# Patient Record
Sex: FEMALE | Race: BLACK OR AFRICAN AMERICAN | Employment: UNEMPLOYED | URBAN - METROPOLITAN AREA
[De-identification: names, ages, dates, MRNs, and addresses within clinical notes are randomized per-mention and may not be internally consistent; named-entity substitution may affect disease eponyms.]

---

## 2018-05-15 ENCOUNTER — HOSPITAL ENCOUNTER (EMERGENCY)
Age: 23
Discharge: HOME | End: 2018-05-15
Attending: EMERGENCY MEDICINE
Payer: COMMERCIAL

## 2018-05-15 VITALS
HEART RATE: 77 BPM | WEIGHT: 98 LBS | BODY MASS INDEX: 19.14 KG/M2 | OXYGEN SATURATION: 98 % | DIASTOLIC BLOOD PRESSURE: 73 MMHG | TEMPERATURE: 98.6 F | SYSTOLIC BLOOD PRESSURE: 111 MMHG | RESPIRATION RATE: 16 BRPM

## 2018-05-15 DIAGNOSIS — T78.40XA ALLERGIC REACTION, INITIAL ENCOUNTER: Primary | ICD-10-CM

## 2018-05-15 PROCEDURE — 99282 EMERGENCY DEPT VISIT SF MDM: CPT

## 2018-05-15 RX ORDER — METHYLPREDNISOLONE 4 MG/1
TABLET ORAL
Qty: 1 KIT | Refills: 0 | Status: SHIPPED | OUTPATIENT
Start: 2018-05-15 | End: 2018-05-21

## 2019-01-04 ENCOUNTER — APPOINTMENT (OUTPATIENT)
Dept: LABOR AND DELIVERY | Age: 24
End: 2019-01-04
Payer: COMMERCIAL

## 2019-01-04 ENCOUNTER — HOSPITAL ENCOUNTER (OUTPATIENT)
Age: 24
Discharge: HOME | End: 2019-01-04
Attending: OBSTETRICS & GYNECOLOGY | Admitting: OBSTETRICS & GYNECOLOGY
Payer: COMMERCIAL

## 2019-01-04 VITALS
SYSTOLIC BLOOD PRESSURE: 99 MMHG | HEART RATE: 84 BPM | DIASTOLIC BLOOD PRESSURE: 54 MMHG | TEMPERATURE: 98.2 F | RESPIRATION RATE: 16 BRPM

## 2019-01-04 PROBLEM — O36.5930: Status: ACTIVE | Noted: 2019-01-04

## 2019-01-04 PROCEDURE — 59025 FETAL NON-STRESS TEST: CPT

## 2019-01-07 ENCOUNTER — HOSPITAL ENCOUNTER (OUTPATIENT)
Age: 24
Discharge: HOME | End: 2019-01-07
Attending: OBSTETRICS & GYNECOLOGY | Admitting: OBSTETRICS & GYNECOLOGY
Payer: COMMERCIAL

## 2019-01-07 ENCOUNTER — APPOINTMENT (OUTPATIENT)
Dept: LABOR AND DELIVERY | Age: 24
End: 2019-01-07
Payer: COMMERCIAL

## 2019-01-07 VITALS
SYSTOLIC BLOOD PRESSURE: 104 MMHG | TEMPERATURE: 98.3 F | HEART RATE: 75 BPM | RESPIRATION RATE: 16 BRPM | DIASTOLIC BLOOD PRESSURE: 59 MMHG

## 2019-01-07 PROCEDURE — 59025 FETAL NON-STRESS TEST: CPT

## 2019-01-11 ENCOUNTER — APPOINTMENT (OUTPATIENT)
Dept: LABOR AND DELIVERY | Age: 24
End: 2019-01-11
Payer: COMMERCIAL

## 2019-01-11 ENCOUNTER — HOSPITAL ENCOUNTER (OUTPATIENT)
Age: 24
Discharge: HOME | End: 2019-01-11
Attending: OBSTETRICS & GYNECOLOGY | Admitting: OBSTETRICS & GYNECOLOGY
Payer: COMMERCIAL

## 2019-01-11 VITALS
SYSTOLIC BLOOD PRESSURE: 105 MMHG | RESPIRATION RATE: 16 BRPM | DIASTOLIC BLOOD PRESSURE: 69 MMHG | HEART RATE: 81 BPM | TEMPERATURE: 98 F

## 2019-01-11 PROCEDURE — 59025 FETAL NON-STRESS TEST: CPT

## 2019-01-14 ENCOUNTER — HOSPITAL ENCOUNTER (OUTPATIENT)
Age: 24
Discharge: HOME | End: 2019-01-14
Attending: OBSTETRICS & GYNECOLOGY | Admitting: OBSTETRICS & GYNECOLOGY
Payer: COMMERCIAL

## 2019-01-14 ENCOUNTER — APPOINTMENT (OUTPATIENT)
Dept: LABOR AND DELIVERY | Age: 24
End: 2019-01-14
Payer: COMMERCIAL

## 2019-01-14 VITALS
SYSTOLIC BLOOD PRESSURE: 91 MMHG | TEMPERATURE: 98.1 F | DIASTOLIC BLOOD PRESSURE: 57 MMHG | RESPIRATION RATE: 16 BRPM | HEART RATE: 86 BPM

## 2019-01-14 PROCEDURE — 59025 FETAL NON-STRESS TEST: CPT

## 2019-01-18 ENCOUNTER — APPOINTMENT (OUTPATIENT)
Dept: LABOR AND DELIVERY | Age: 24
End: 2019-01-18
Payer: COMMERCIAL

## 2019-01-18 ENCOUNTER — HOSPITAL ENCOUNTER (OUTPATIENT)
Age: 24
Discharge: HOME | End: 2019-01-18
Attending: OBSTETRICS & GYNECOLOGY | Admitting: OBSTETRICS & GYNECOLOGY
Payer: COMMERCIAL

## 2019-01-18 ENCOUNTER — APPOINTMENT (OUTPATIENT)
Dept: ULTRASOUND IMAGING | Age: 24
End: 2019-01-18
Payer: COMMERCIAL

## 2019-01-18 VITALS
RESPIRATION RATE: 16 BRPM | HEART RATE: 74 BPM | SYSTOLIC BLOOD PRESSURE: 93 MMHG | TEMPERATURE: 98.1 F | DIASTOLIC BLOOD PRESSURE: 50 MMHG

## 2019-01-18 PROCEDURE — 59025 FETAL NON-STRESS TEST: CPT

## 2019-01-18 PROCEDURE — 99211 OFF/OP EST MAY X REQ PHY/QHP: CPT

## 2019-01-18 PROCEDURE — 76819 FETAL BIOPHYS PROFIL W/O NST: CPT

## 2019-01-22 ENCOUNTER — HOSPITAL ENCOUNTER (OUTPATIENT)
Age: 24
Discharge: HOME | End: 2019-01-22
Attending: OBSTETRICS & GYNECOLOGY | Admitting: OBSTETRICS & GYNECOLOGY
Payer: COMMERCIAL

## 2019-01-22 ENCOUNTER — APPOINTMENT (OUTPATIENT)
Dept: LABOR AND DELIVERY | Age: 24
End: 2019-01-22
Payer: COMMERCIAL

## 2019-01-22 VITALS
DIASTOLIC BLOOD PRESSURE: 64 MMHG | TEMPERATURE: 98.7 F | SYSTOLIC BLOOD PRESSURE: 102 MMHG | RESPIRATION RATE: 14 BRPM | HEART RATE: 81 BPM

## 2019-01-22 PROCEDURE — 59025 FETAL NON-STRESS TEST: CPT

## 2019-01-25 ENCOUNTER — APPOINTMENT (OUTPATIENT)
Dept: LABOR AND DELIVERY | Age: 24
End: 2019-01-25
Payer: COMMERCIAL

## 2019-01-25 ENCOUNTER — HOSPITAL ENCOUNTER (OUTPATIENT)
Age: 24
Discharge: HOME | End: 2019-01-25
Attending: OBSTETRICS & GYNECOLOGY | Admitting: OBSTETRICS & GYNECOLOGY
Payer: COMMERCIAL

## 2019-01-25 VITALS
HEART RATE: 80 BPM | RESPIRATION RATE: 14 BRPM | SYSTOLIC BLOOD PRESSURE: 108 MMHG | TEMPERATURE: 98.1 F | DIASTOLIC BLOOD PRESSURE: 62 MMHG

## 2019-01-25 PROCEDURE — 59025 FETAL NON-STRESS TEST: CPT

## 2019-01-29 ENCOUNTER — HOSPITAL ENCOUNTER (OUTPATIENT)
Age: 24
Discharge: HOME | End: 2019-01-29
Attending: OBSTETRICS & GYNECOLOGY | Admitting: OBSTETRICS & GYNECOLOGY
Payer: COMMERCIAL

## 2019-01-29 ENCOUNTER — APPOINTMENT (OUTPATIENT)
Dept: LABOR AND DELIVERY | Age: 24
End: 2019-01-29
Payer: COMMERCIAL

## 2019-01-29 VITALS
RESPIRATION RATE: 16 BRPM | HEART RATE: 74 BPM | SYSTOLIC BLOOD PRESSURE: 106 MMHG | TEMPERATURE: 98.3 F | DIASTOLIC BLOOD PRESSURE: 60 MMHG

## 2019-01-29 PROCEDURE — 59025 FETAL NON-STRESS TEST: CPT

## 2019-01-31 ENCOUNTER — HOSPITAL ENCOUNTER (OUTPATIENT)
Age: 24
Setting detail: OBSERVATION
Discharge: HOME | End: 2019-02-01
Attending: OBSTETRICS & GYNECOLOGY | Admitting: OBSTETRICS & GYNECOLOGY
Payer: COMMERCIAL

## 2019-01-31 ENCOUNTER — HOSPITAL ENCOUNTER (OUTPATIENT)
Age: 24
Discharge: HOME | End: 2019-02-02
Payer: COMMERCIAL

## 2019-01-31 PROBLEM — O26.93 COMPLICATED PREGNANCY, THIRD TRIMESTER: Status: ACTIVE | Noted: 2019-01-31

## 2019-01-31 LAB
AMPHETAMINE SCREEN, URINE: NOT DETECTED
BACTERIA: ABNORMAL /HPF
BARBITURATE SCREEN URINE: NOT DETECTED
BENZODIAZEPINE SCREEN, URINE: NOT DETECTED
BILIRUBIN URINE: NEGATIVE
BLOOD, URINE: NEGATIVE
CANNABINOID SCREEN URINE: NOT DETECTED
CLARITY: CLEAR
COCAINE METABOLITE SCREEN URINE: NOT DETECTED
COLOR: YELLOW
EPITHELIAL CELLS, UA: ABNORMAL /HPF
GLUCOSE URINE: NEGATIVE MG/DL
KETONES, URINE: NEGATIVE MG/DL
LEUKOCYTE ESTERASE, URINE: ABNORMAL
METHADONE SCREEN, URINE: NOT DETECTED
NITRITE, URINE: NEGATIVE
OPIATE SCREEN URINE: NOT DETECTED
PH UA: 7.5 (ref 5–9)
PHENCYCLIDINE SCREEN URINE: NOT DETECTED
PROPOXYPHENE SCREEN: NOT DETECTED
PROTEIN UA: NEGATIVE MG/DL
RBC UA: ABNORMAL /HPF (ref 0–2)
SPECIFIC GRAVITY UA: 1.02 (ref 1–1.03)
UROBILINOGEN, URINE: 0.2 E.U./DL
WBC UA: ABNORMAL /HPF (ref 0–5)

## 2019-01-31 PROCEDURE — 2580000003 HC RX 258: Performed by: OBSTETRICS & GYNECOLOGY

## 2019-01-31 PROCEDURE — 81001 URINALYSIS AUTO W/SCOPE: CPT

## 2019-01-31 PROCEDURE — 96360 HYDRATION IV INFUSION INIT: CPT

## 2019-01-31 PROCEDURE — 87081 CULTURE SCREEN ONLY: CPT

## 2019-01-31 PROCEDURE — 80307 DRUG TEST PRSMV CHEM ANLYZR: CPT

## 2019-01-31 RX ORDER — SODIUM CHLORIDE, SODIUM LACTATE, POTASSIUM CHLORIDE, CALCIUM CHLORIDE 600; 310; 30; 20 MG/100ML; MG/100ML; MG/100ML; MG/100ML
INJECTION, SOLUTION INTRAVENOUS CONTINUOUS
Status: DISCONTINUED | OUTPATIENT
Start: 2019-02-01 | End: 2019-02-01 | Stop reason: HOSPADM

## 2019-01-31 RX ORDER — SODIUM CHLORIDE, SODIUM LACTATE, POTASSIUM CHLORIDE, AND CALCIUM CHLORIDE .6; .31; .03; .02 G/100ML; G/100ML; G/100ML; G/100ML
500 INJECTION, SOLUTION INTRAVENOUS ONCE
Status: COMPLETED | OUTPATIENT
Start: 2019-01-31 | End: 2019-02-01

## 2019-01-31 RX ADMIN — SODIUM CHLORIDE, POTASSIUM CHLORIDE, SODIUM LACTATE AND CALCIUM CHLORIDE 500 ML: 600; 310; 30; 20 INJECTION, SOLUTION INTRAVENOUS at 23:05

## 2019-01-31 ASSESSMENT — PAIN SCALES - GENERAL: PAINLEVEL_OUTOF10: 0

## 2019-02-01 VITALS
HEART RATE: 79 BPM | DIASTOLIC BLOOD PRESSURE: 51 MMHG | TEMPERATURE: 98 F | SYSTOLIC BLOOD PRESSURE: 90 MMHG | RESPIRATION RATE: 16 BRPM

## 2019-02-01 PROBLEM — O47.03 FALSE LABOR BEFORE 37 COMPLETED WEEKS OF GESTATION IN THIRD TRIMESTER: Status: ACTIVE | Noted: 2019-02-01

## 2019-02-01 PROCEDURE — 96360 HYDRATION IV INFUSION INIT: CPT

## 2019-02-01 PROCEDURE — G0378 HOSPITAL OBSERVATION PER HR: HCPCS

## 2019-02-01 PROCEDURE — 99211 OFF/OP EST MAY X REQ PHY/QHP: CPT

## 2019-02-01 PROCEDURE — 96361 HYDRATE IV INFUSION ADD-ON: CPT

## 2019-02-01 ASSESSMENT — PAIN SCALES - GENERAL: PAINLEVEL_OUTOF10: 0

## 2019-02-03 LAB — GROUP B STREP CULTURE: NORMAL

## 2019-02-05 ENCOUNTER — APPOINTMENT (OUTPATIENT)
Dept: LABOR AND DELIVERY | Age: 24
End: 2019-02-05
Payer: COMMERCIAL

## 2019-02-05 ENCOUNTER — HOSPITAL ENCOUNTER (OUTPATIENT)
Age: 24
Discharge: HOME | End: 2019-02-05
Attending: OBSTETRICS & GYNECOLOGY | Admitting: OBSTETRICS & GYNECOLOGY
Payer: COMMERCIAL

## 2019-02-05 VITALS
RESPIRATION RATE: 18 BRPM | TEMPERATURE: 98 F | HEART RATE: 81 BPM | DIASTOLIC BLOOD PRESSURE: 69 MMHG | SYSTOLIC BLOOD PRESSURE: 102 MMHG

## 2019-02-05 PROCEDURE — G0379 DIRECT REFER HOSPITAL OBSERV: HCPCS

## 2019-02-05 PROCEDURE — G0378 HOSPITAL OBSERVATION PER HR: HCPCS

## 2019-02-05 PROCEDURE — 59025 FETAL NON-STRESS TEST: CPT

## 2019-02-08 ENCOUNTER — APPOINTMENT (OUTPATIENT)
Dept: LABOR AND DELIVERY | Age: 24
End: 2019-02-08
Payer: COMMERCIAL

## 2019-02-08 ENCOUNTER — HOSPITAL ENCOUNTER (OUTPATIENT)
Age: 24
Discharge: HOME | End: 2019-02-08
Attending: OBSTETRICS & GYNECOLOGY | Admitting: OBSTETRICS & GYNECOLOGY
Payer: COMMERCIAL

## 2019-02-08 VITALS
TEMPERATURE: 98.1 F | RESPIRATION RATE: 16 BRPM | DIASTOLIC BLOOD PRESSURE: 65 MMHG | SYSTOLIC BLOOD PRESSURE: 101 MMHG | HEART RATE: 63 BPM

## 2019-02-08 PROCEDURE — 59025 FETAL NON-STRESS TEST: CPT

## 2019-02-11 ENCOUNTER — HOSPITAL ENCOUNTER (OUTPATIENT)
Age: 24
Discharge: HOME | End: 2019-02-11
Attending: OBSTETRICS & GYNECOLOGY | Admitting: OBSTETRICS & GYNECOLOGY
Payer: COMMERCIAL

## 2019-02-11 ENCOUNTER — APPOINTMENT (OUTPATIENT)
Dept: LABOR AND DELIVERY | Age: 24
End: 2019-02-11
Payer: COMMERCIAL

## 2019-02-11 VITALS
DIASTOLIC BLOOD PRESSURE: 65 MMHG | HEART RATE: 75 BPM | SYSTOLIC BLOOD PRESSURE: 103 MMHG | RESPIRATION RATE: 16 BRPM | TEMPERATURE: 98.9 F

## 2019-02-11 PROCEDURE — 59025 FETAL NON-STRESS TEST: CPT

## 2019-02-15 ENCOUNTER — APPOINTMENT (OUTPATIENT)
Dept: LABOR AND DELIVERY | Age: 24
End: 2019-02-15
Payer: COMMERCIAL

## 2019-02-15 ENCOUNTER — HOSPITAL ENCOUNTER (OUTPATIENT)
Age: 24
Discharge: HOME | End: 2019-02-15
Attending: OBSTETRICS & GYNECOLOGY | Admitting: OBSTETRICS & GYNECOLOGY
Payer: COMMERCIAL

## 2019-02-15 VITALS
HEART RATE: 90 BPM | RESPIRATION RATE: 16 BRPM | TEMPERATURE: 98.4 F | DIASTOLIC BLOOD PRESSURE: 69 MMHG | SYSTOLIC BLOOD PRESSURE: 104 MMHG

## 2019-02-15 PROCEDURE — 59025 FETAL NON-STRESS TEST: CPT

## 2019-02-19 ENCOUNTER — HOSPITAL ENCOUNTER (OUTPATIENT)
Age: 24
Discharge: HOME | End: 2019-02-19
Attending: OBSTETRICS & GYNECOLOGY | Admitting: OBSTETRICS & GYNECOLOGY
Payer: COMMERCIAL

## 2019-02-19 VITALS
HEART RATE: 68 BPM | DIASTOLIC BLOOD PRESSURE: 49 MMHG | SYSTOLIC BLOOD PRESSURE: 87 MMHG | RESPIRATION RATE: 16 BRPM | TEMPERATURE: 98 F

## 2019-02-19 PROCEDURE — 59025 FETAL NON-STRESS TEST: CPT

## 2019-02-22 ENCOUNTER — HOSPITAL ENCOUNTER (OUTPATIENT)
Age: 24
Discharge: HOME | End: 2019-02-22
Attending: OBSTETRICS & GYNECOLOGY | Admitting: OBSTETRICS & GYNECOLOGY
Payer: COMMERCIAL

## 2019-02-22 ENCOUNTER — APPOINTMENT (OUTPATIENT)
Dept: LABOR AND DELIVERY | Age: 24
End: 2019-02-22
Payer: COMMERCIAL

## 2019-02-22 VITALS
HEART RATE: 79 BPM | RESPIRATION RATE: 16 BRPM | TEMPERATURE: 98.2 F | SYSTOLIC BLOOD PRESSURE: 102 MMHG | DIASTOLIC BLOOD PRESSURE: 61 MMHG

## 2019-02-22 PROCEDURE — 59025 FETAL NON-STRESS TEST: CPT

## 2019-02-26 ENCOUNTER — APPOINTMENT (OUTPATIENT)
Dept: LABOR AND DELIVERY | Age: 24
End: 2019-02-26
Payer: COMMERCIAL

## 2019-02-26 ENCOUNTER — HOSPITAL ENCOUNTER (OUTPATIENT)
Age: 24
Discharge: HOME | End: 2019-02-26
Attending: OBSTETRICS & GYNECOLOGY | Admitting: OBSTETRICS & GYNECOLOGY
Payer: COMMERCIAL

## 2019-02-26 VITALS
RESPIRATION RATE: 18 BRPM | TEMPERATURE: 99.3 F | DIASTOLIC BLOOD PRESSURE: 67 MMHG | SYSTOLIC BLOOD PRESSURE: 104 MMHG | HEART RATE: 73 BPM

## 2019-02-26 PROCEDURE — 59025 FETAL NON-STRESS TEST: CPT

## 2019-03-01 ENCOUNTER — HOSPITAL ENCOUNTER (OUTPATIENT)
Age: 24
Discharge: HOME | DRG: 560 | End: 2019-03-01
Attending: OBSTETRICS & GYNECOLOGY | Admitting: OBSTETRICS & GYNECOLOGY
Payer: COMMERCIAL

## 2019-03-01 ENCOUNTER — APPOINTMENT (OUTPATIENT)
Dept: LABOR AND DELIVERY | Age: 24
DRG: 560 | End: 2019-03-01
Payer: COMMERCIAL

## 2019-03-01 VITALS
SYSTOLIC BLOOD PRESSURE: 118 MMHG | RESPIRATION RATE: 16 BRPM | HEART RATE: 80 BPM | TEMPERATURE: 98.4 F | DIASTOLIC BLOOD PRESSURE: 63 MMHG

## 2019-03-01 PROCEDURE — 59025 FETAL NON-STRESS TEST: CPT

## 2019-03-02 NOTE — PROGRESS NOTES
Diagnosis:       IUGR      Result:             Reactive         Plan:                Discharge home                          F/U as scheduled        Rico Kathleen

## 2019-03-04 ENCOUNTER — ANESTHESIA (OUTPATIENT)
Dept: LABOR AND DELIVERY | Age: 24
DRG: 560 | End: 2019-03-04
Payer: COMMERCIAL

## 2019-03-04 ENCOUNTER — HOSPITAL ENCOUNTER (INPATIENT)
Age: 24
LOS: 2 days | Discharge: HOME | DRG: 560 | End: 2019-03-06
Attending: OBSTETRICS & GYNECOLOGY | Admitting: OBSTETRICS & GYNECOLOGY
Payer: COMMERCIAL

## 2019-03-04 ENCOUNTER — ANESTHESIA EVENT (OUTPATIENT)
Dept: LABOR AND DELIVERY | Age: 24
DRG: 560 | End: 2019-03-04
Payer: COMMERCIAL

## 2019-03-04 PROBLEM — O26.93 COMPLICATED PREGNANCY, THIRD TRIMESTER: Status: RESOLVED | Noted: 2019-01-31 | Resolved: 2019-03-04

## 2019-03-04 PROBLEM — O47.03 FALSE LABOR BEFORE 37 COMPLETED WEEKS OF GESTATION IN THIRD TRIMESTER: Status: RESOLVED | Noted: 2019-02-01 | Resolved: 2019-03-04

## 2019-03-04 PROBLEM — Z3A.39 39 WEEKS GESTATION OF PREGNANCY: Status: ACTIVE | Noted: 2019-03-04

## 2019-03-04 LAB
ABO/RH: NORMAL
AMPHETAMINE SCREEN, URINE: NOT DETECTED
ANTIBODY SCREEN: NORMAL
BARBITURATE SCREEN URINE: NOT DETECTED
BENZODIAZEPINE SCREEN, URINE: NOT DETECTED
CANNABINOID SCREEN URINE: NOT DETECTED
COCAINE METABOLITE SCREEN URINE: NOT DETECTED
HCT VFR BLD CALC: 35.4 % (ref 34–48)
HEMOGLOBIN: 11.6 G/DL (ref 11.5–15.5)
MCH RBC QN AUTO: 30.8 PG (ref 26–35)
MCHC RBC AUTO-ENTMCNC: 32.8 % (ref 32–34.5)
MCV RBC AUTO: 93.9 FL (ref 80–99.9)
METHADONE SCREEN, URINE: NOT DETECTED
OPIATE SCREEN URINE: NOT DETECTED
PDW BLD-RTO: 15.1 FL (ref 11.5–15)
PHENCYCLIDINE SCREEN URINE: NOT DETECTED
PLATELET # BLD: 156 E9/L (ref 130–450)
PMV BLD AUTO: 11.6 FL (ref 7–12)
PROPOXYPHENE SCREEN: NOT DETECTED
RBC # BLD: 3.77 E12/L (ref 3.5–5.5)
WBC # BLD: 5.4 E9/L (ref 4.5–11.5)

## 2019-03-04 PROCEDURE — 7200000001 HC VAGINAL DELIVERY

## 2019-03-04 PROCEDURE — 6360000002 HC RX W HCPCS: Performed by: OBSTETRICS & GYNECOLOGY

## 2019-03-04 PROCEDURE — 6370000000 HC RX 637 (ALT 250 FOR IP): Performed by: OBSTETRICS & GYNECOLOGY

## 2019-03-04 PROCEDURE — 2500000003 HC RX 250 WO HCPCS: Performed by: ANESTHESIOLOGY

## 2019-03-04 PROCEDURE — 85027 COMPLETE CBC AUTOMATED: CPT

## 2019-03-04 PROCEDURE — 2580000003 HC RX 258: Performed by: OBSTETRICS & GYNECOLOGY

## 2019-03-04 PROCEDURE — 3700000025 EPIDURAL BLOCK: Performed by: ANESTHESIOLOGY

## 2019-03-04 PROCEDURE — 86850 RBC ANTIBODY SCREEN: CPT

## 2019-03-04 PROCEDURE — 1220000001 HC SEMI PRIVATE L&D R&B

## 2019-03-04 PROCEDURE — 86900 BLOOD TYPING SEROLOGIC ABO: CPT

## 2019-03-04 PROCEDURE — 36415 COLL VENOUS BLD VENIPUNCTURE: CPT

## 2019-03-04 PROCEDURE — 86901 BLOOD TYPING SEROLOGIC RH(D): CPT

## 2019-03-04 PROCEDURE — 80307 DRUG TEST PRSMV CHEM ANLYZR: CPT

## 2019-03-04 RX ORDER — TERBUTALINE SULFATE 1 MG/ML
0.25 INJECTION, SOLUTION SUBCUTANEOUS ONCE
Status: DISCONTINUED | OUTPATIENT
Start: 2019-03-04 | End: 2019-03-04

## 2019-03-04 RX ORDER — CLINDAMYCIN PHOSPHATE 900 MG/50ML
900 INJECTION INTRAVENOUS EVERY 8 HOURS
Status: DISCONTINUED | OUTPATIENT
Start: 2019-03-04 | End: 2019-03-04 | Stop reason: CLARIF

## 2019-03-04 RX ORDER — FERROUS SULFATE 325(65) MG
325 TABLET ORAL 2 TIMES DAILY WITH MEALS
Status: DISCONTINUED | OUTPATIENT
Start: 2019-03-05 | End: 2019-03-06 | Stop reason: HOSPADM

## 2019-03-04 RX ORDER — IBUPROFEN 800 MG/1
800 TABLET ORAL EVERY 6 HOURS PRN
Status: DISCONTINUED | OUTPATIENT
Start: 2019-03-04 | End: 2019-03-06 | Stop reason: HOSPADM

## 2019-03-04 RX ORDER — SODIUM CHLORIDE, SODIUM LACTATE, POTASSIUM CHLORIDE, CALCIUM CHLORIDE 600; 310; 30; 20 MG/100ML; MG/100ML; MG/100ML; MG/100ML
500 INJECTION, SOLUTION INTRAVENOUS
Status: DISCONTINUED | OUTPATIENT
Start: 2019-03-04 | End: 2019-03-04

## 2019-03-04 RX ORDER — SODIUM CHLORIDE 0.9 % (FLUSH) 0.9 %
10 SYRINGE (ML) INJECTION EVERY 12 HOURS SCHEDULED
Status: DISCONTINUED | OUTPATIENT
Start: 2019-03-04 | End: 2019-03-04

## 2019-03-04 RX ORDER — OXYCODONE HYDROCHLORIDE AND ACETAMINOPHEN 5; 325 MG/1; MG/1
1 TABLET ORAL EVERY 4 HOURS PRN
Status: DISCONTINUED | OUTPATIENT
Start: 2019-03-04 | End: 2019-03-06 | Stop reason: HOSPADM

## 2019-03-04 RX ORDER — SODIUM CHLORIDE 0.9 % (FLUSH) 0.9 %
10 SYRINGE (ML) INJECTION EVERY 12 HOURS SCHEDULED
Status: DISCONTINUED | OUTPATIENT
Start: 2019-03-04 | End: 2019-03-06 | Stop reason: HOSPADM

## 2019-03-04 RX ORDER — SODIUM CHLORIDE, SODIUM LACTATE, POTASSIUM CHLORIDE, CALCIUM CHLORIDE 600; 310; 30; 20 MG/100ML; MG/100ML; MG/100ML; MG/100ML
INJECTION, SOLUTION INTRAVENOUS CONTINUOUS
Status: DISCONTINUED | OUTPATIENT
Start: 2019-03-04 | End: 2019-03-04

## 2019-03-04 RX ORDER — ONDANSETRON 2 MG/ML
4 INJECTION INTRAMUSCULAR; INTRAVENOUS EVERY 6 HOURS PRN
Status: DISCONTINUED | OUTPATIENT
Start: 2019-03-04 | End: 2019-03-04

## 2019-03-04 RX ORDER — SODIUM CHLORIDE 0.9 % (FLUSH) 0.9 %
10 SYRINGE (ML) INJECTION PRN
Status: DISCONTINUED | OUTPATIENT
Start: 2019-03-04 | End: 2019-03-06 | Stop reason: HOSPADM

## 2019-03-04 RX ORDER — LIDOCAINE HYDROCHLORIDE 10 MG/ML
30 INJECTION, SOLUTION EPIDURAL; INFILTRATION; INTRACAUDAL; PERINEURAL PRN
Status: DISCONTINUED | OUTPATIENT
Start: 2019-03-04 | End: 2019-03-04

## 2019-03-04 RX ORDER — OXYCODONE HYDROCHLORIDE AND ACETAMINOPHEN 5; 325 MG/1; MG/1
2 TABLET ORAL EVERY 4 HOURS PRN
Status: DISCONTINUED | OUTPATIENT
Start: 2019-03-04 | End: 2019-03-06 | Stop reason: HOSPADM

## 2019-03-04 RX ORDER — SODIUM CHLORIDE, SODIUM LACTATE, POTASSIUM CHLORIDE, CALCIUM CHLORIDE 600; 310; 30; 20 MG/100ML; MG/100ML; MG/100ML; MG/100ML
500 INJECTION, SOLUTION INTRAVENOUS CONTINUOUS
Status: DISCONTINUED | OUTPATIENT
Start: 2019-03-04 | End: 2019-03-04

## 2019-03-04 RX ORDER — ACETAMINOPHEN 325 MG/1
650 TABLET ORAL EVERY 4 HOURS PRN
Status: DISCONTINUED | OUTPATIENT
Start: 2019-03-04 | End: 2019-03-06 | Stop reason: HOSPADM

## 2019-03-04 RX ORDER — EPHEDRINE SULFATE/0.9% NACL/PF 50 MG/5 ML
10 SYRINGE (ML) INTRAVENOUS EVERY 5 MIN PRN
Status: DISCONTINUED | OUTPATIENT
Start: 2019-03-04 | End: 2019-03-04

## 2019-03-04 RX ORDER — LANOLIN 100 %
OINTMENT (GRAM) TOPICAL PRN
Status: DISCONTINUED | OUTPATIENT
Start: 2019-03-04 | End: 2019-03-06 | Stop reason: HOSPADM

## 2019-03-04 RX ORDER — SODIUM CHLORIDE 0.9 % (FLUSH) 0.9 %
10 SYRINGE (ML) INJECTION PRN
Status: DISCONTINUED | OUTPATIENT
Start: 2019-03-04 | End: 2019-03-04

## 2019-03-04 RX ORDER — ONDANSETRON HYDROCHLORIDE 8 MG/1
8 TABLET, FILM COATED ORAL EVERY 8 HOURS PRN
Status: DISCONTINUED | OUTPATIENT
Start: 2019-03-04 | End: 2019-03-06 | Stop reason: HOSPADM

## 2019-03-04 RX ORDER — DOCUSATE SODIUM 100 MG/1
100 CAPSULE, LIQUID FILLED ORAL 2 TIMES DAILY
Status: DISCONTINUED | OUTPATIENT
Start: 2019-03-04 | End: 2019-03-06 | Stop reason: HOSPADM

## 2019-03-04 RX ORDER — NALBUPHINE HCL 10 MG/ML
5 AMPUL (ML) INJECTION
Status: DISCONTINUED | OUTPATIENT
Start: 2019-03-04 | End: 2019-03-04

## 2019-03-04 RX ADMIN — Medication 12 ML/HR: at 14:42

## 2019-03-04 RX ADMIN — Medication 10 ML: at 14:36

## 2019-03-04 RX ADMIN — OXYCODONE AND ACETAMINOPHEN 1 TABLET: 5; 325 TABLET ORAL at 21:48

## 2019-03-04 RX ADMIN — Medication 2 MILLI-UNITS/MIN: at 17:36

## 2019-03-04 RX ADMIN — Medication 999 MILLI-UNITS/MIN: at 18:24

## 2019-03-04 RX ADMIN — SODIUM CHLORIDE, POTASSIUM CHLORIDE, SODIUM LACTATE AND CALCIUM CHLORIDE: 600; 310; 30; 20 INJECTION, SOLUTION INTRAVENOUS at 11:00

## 2019-03-04 RX ADMIN — Medication 5 ML: at 14:42

## 2019-03-04 RX ADMIN — Medication 4 MILLI-UNITS/MIN: at 18:06

## 2019-03-04 ASSESSMENT — PAIN SCALES - GENERAL: PAINLEVEL_OUTOF10: 3

## 2019-03-04 NOTE — H&P
Subjective:      Vanessa Tristan is an 24 y.o. female at 44 and 6/7 weeks gestation presenting with   Chief Complaint   Patient presents with    Contractions   . She is also complaining of contractions every 3 minutes lasting 40 seconds. Other associated symptoms include low back pain. Fetal Movement: normal.  She received epidural and comfortable  Past Medical History:   Diagnosis Date    Abnormal Pap smear     False labor before 37 completed weeks of gestation in third trimester 2/1/2019    Intrauterine growth restriction affecting care of mother, antepartum, third trimester, not applicable or unspecified fetus 1/4/2019    Iron deficiency anemia 4/21/2015       Review of Systems  Pertinent items are noted in HPI.       Objective:      BP 96/64   Pulse 68   Temp 98 °F (36.7 °C) (Oral)   Resp 16   Ht 5' 1\" (1.549 m)   Wt 112 lb (50.8 kg)   LMP 05/01/2018   BMI 21.16 kg/m²   General:   alert, appears stated age and cooperative   Cervix:   dilated to 10 cm and bulging membranes, AROM clear fluids   FHT:   135 BPM     Lab Review    CBC:   Lab Results   Component Value Date    WBC 5.4 03/04/2019    RBC 3.77 03/04/2019    HGB 11.6 03/04/2019    HCT 35.4 03/04/2019    MCV 93.9 03/04/2019    MCH 30.8 03/04/2019    MCHC 32.8 03/04/2019    RDW 15.1 03/04/2019     03/04/2019    MPV 11.6 03/04/2019     CMP:    Lab Results   Component Value Date     01/08/2015    K 4.5 01/08/2015     01/08/2015    CO2 26 01/08/2015    BUN 7 01/08/2015    CREATININE 0.6 01/08/2015    GFRAA >60 01/08/2015    LABGLOM >60 01/08/2015    GLUCOSE 82 01/08/2015    PROT 7.2 01/08/2015    LABALBU 4.1 01/08/2015    CALCIUM 9.3 01/08/2015    BILITOT 0.4 01/08/2015    ALKPHOS 36 01/08/2015    AST 18 01/08/2015    ALT 15 01/08/2015     U/A:    Lab Results   Component Value Date    COLORU Yellow 01/31/2019    PHUR 7.5 01/31/2019    WBCUA 2-5 01/31/2019    RBCUA 0-1 01/31/2019    RBCUA NONE 03/07/2014    BACTERIA MODERATE 01/31/2019    CLARITYU Clear 01/31/2019    SPECGRAV 1.020 01/31/2019    LEUKOCYTESUR TRACE 01/31/2019    UROBILINOGEN 0.2 01/31/2019    BILIRUBINUR Negative 01/31/2019    BLOODU Negative 01/31/2019    GLUCOSEU Negative 01/31/2019          Assessment:     Active Labor      Plan:     Expectant management    Rico Kathleen MD,3/4/2019 4:29 PM

## 2019-03-05 LAB
HCT VFR BLD CALC: 31.7 % (ref 34–48)
HEMOGLOBIN: 10.3 G/DL (ref 11.5–15.5)

## 2019-03-05 PROCEDURE — 1220000001 HC SEMI PRIVATE L&D R&B

## 2019-03-05 PROCEDURE — 85014 HEMATOCRIT: CPT

## 2019-03-05 PROCEDURE — 85018 HEMOGLOBIN: CPT

## 2019-03-05 PROCEDURE — 6370000000 HC RX 637 (ALT 250 FOR IP): Performed by: OBSTETRICS & GYNECOLOGY

## 2019-03-05 PROCEDURE — 36415 COLL VENOUS BLD VENIPUNCTURE: CPT

## 2019-03-05 RX ADMIN — IBUPROFEN 800 MG: 800 TABLET, FILM COATED ORAL at 02:33

## 2019-03-05 RX ADMIN — IBUPROFEN 800 MG: 800 TABLET, FILM COATED ORAL at 16:55

## 2019-03-05 RX ADMIN — DOCUSATE SODIUM 100 MG: 100 CAPSULE, LIQUID FILLED ORAL at 21:11

## 2019-03-05 RX ADMIN — DOCUSATE SODIUM 100 MG: 100 CAPSULE, LIQUID FILLED ORAL at 09:15

## 2019-03-05 ASSESSMENT — PAIN DESCRIPTION - PROGRESSION: CLINICAL_PROGRESSION: RESOLVED

## 2019-03-05 ASSESSMENT — PAIN SCALES - GENERAL
PAINLEVEL_OUTOF10: 2
PAINLEVEL_OUTOF10: 2

## 2019-03-05 NOTE — PROGRESS NOTES
Patient arrived ambulatory to labor and delivery,states she's had contractions since 230am this morning. Oriented to room, instructed on clean catch urine.

## 2019-03-05 NOTE — PROGRESS NOTES
Dr. Abraham Omalley notified that attempt at pushing made but cervix was still noted. Dr. Abraham Omalley at bedside to examine pt states there is still a lip of cervix to start pitocin and let patient labor down.

## 2019-03-05 NOTE — PROGRESS NOTES
Pt watched the shaken baby syndrome and safe sleep videos. Verbalized understanding and had no questions. Paper signed by pt to promise she will not shake her baby and she understands the risks associated with those behaviors.

## 2019-03-05 NOTE — PROGRESS NOTES
Assumed care of pt for this shift. Discussed plan of care for the shift. Discussed safe sleep practices with patient for infant. Pt verbalizes understanding without questions at this time. Call light within reach. Pt denies any pain or discomfort. Rest encouraged.

## 2019-03-05 NOTE — PROGRESS NOTES
Pt out of knee chest states she cant stay in that position. Pt encouraged to semi fowlers. Pt requires encouragement states shes feeling pain with cxns in abd.

## 2019-03-05 NOTE — PROGRESS NOTES
Notified by prokup RN that Dr. Ailin Stratton was at bedside for AROM and that Leah-Aal reported full dilation, and to shut off epidural and start attempting to push when when his other pt delivers.

## 2019-03-05 NOTE — PROGRESS NOTES
Department of Obstetrics and Gynecology  Labor and Delivery  Attending Post Partum Progress Note      SUBJECTIVE:  Doing well with no complaints  OBJECTIVE:      Vitals:  /70   Pulse 72   Temp 98.5 °F (36.9 °C) (Oral)   Resp 16   Ht 5' 1\" (1.549 m)   Wt 112 lb (50.8 kg)   LMP 05/01/2018   SpO2 99%   Breastfeeding? Unknown   BMI 21.16 kg/m²     ABDOMEN:  Soft, well contracted uterus   Lochia: Normal  No calf tenderness    DATA:    CBC:    Lab Results   Component Value Date    WBC 5.4 03/04/2019    RBC 3.77 03/04/2019    HGB 10.3 03/05/2019    HCT 31.7 03/05/2019    MCV 93.9 03/04/2019    RDW 15.1 03/04/2019     03/04/2019       ASSESSMENT & PLAN:      PPD # 1    Continue current care. Discharge home tomorrow.

## 2019-03-06 VITALS
TEMPERATURE: 98.4 F | DIASTOLIC BLOOD PRESSURE: 62 MMHG | WEIGHT: 112 LBS | BODY MASS INDEX: 21.14 KG/M2 | RESPIRATION RATE: 16 BRPM | SYSTOLIC BLOOD PRESSURE: 104 MMHG | OXYGEN SATURATION: 99 % | HEIGHT: 61 IN | HEART RATE: 66 BPM

## 2019-03-06 PROCEDURE — 6370000000 HC RX 637 (ALT 250 FOR IP): Performed by: OBSTETRICS & GYNECOLOGY

## 2019-03-06 RX ORDER — IBUPROFEN 800 MG/1
800 TABLET ORAL EVERY 6 HOURS PRN
Qty: 120 TABLET | Refills: 1 | Status: SHIPPED | OUTPATIENT
Start: 2019-03-06

## 2019-03-06 RX ADMIN — IBUPROFEN 800 MG: 800 TABLET, FILM COATED ORAL at 00:16

## 2019-03-06 RX ADMIN — DOCUSATE SODIUM 100 MG: 100 CAPSULE, LIQUID FILLED ORAL at 09:14

## 2019-03-06 ASSESSMENT — PAIN SCALES - GENERAL: PAINLEVEL_OUTOF10: 4

## 2019-03-06 NOTE — DISCHARGE SUMMARY
Obstetrical Discharge Form         Patients Name  Vanessa Tristan    Gestational Age:  37w11d    Antepartum complications: intrauterine growth restriction    Date of Delivery:   3/4/2019       6:18 PM      Type of Delivery:   Vaginal, Spontaneous [250]     Rupture Date/time:    3/4/2019      4:22 PM     Presentation:    Vertex [1]     Position:                         Anesthesia:    Epidural [254]     Feeding method:         Delivered By:   Roe MARTINEZ     Baby:       Information for the patient's :  Yesyangel Davis Rich Aguilar [[id number]]          Intrapartum complications: None    Postpartum complications: none    Discharge Date:   3/6/2019    Discharge Condition: Stable    Plan:   Follow up    in 6 weeks

## 2019-03-06 NOTE — PLAN OF CARE
Problem: Pain:  Goal: Control of acute pain  Description  Control of acute pain  Outcome: Met This Shift  Note:   Offer pain medication      Problem: Discharge Planning:  Goal: Discharged to appropriate level of care  Description  Discharged to appropriate level of care  Outcome: Met This Shift  Note:   Plan for discharge later this day 3/6/19

## 2019-03-06 NOTE — PLAN OF CARE
Problem: Pain:  Goal: Pain level will decrease  Description  Pain level will decrease  Outcome: Met This Shift     Problem: Pain:  Goal: Control of acute pain  Description  Control of acute pain  3/6/2019 1248 by Sadaf Rae RN  Outcome: Met This Shift  Note:   Pain medication offered, denies pain at this time  3/6/2019 0909 by Sadaf Rae RN  Outcome: Met This Shift  Note:   Offer pain medication      Problem: Discharge Planning:  Goal: Discharged to appropriate level of care  Description  Discharged to appropriate level of care  3/6/2019 1248 by Sadaf Rae RN  Outcome: Met This Shift  Note:   Discharge order received.  Will D/C this afternoon  3/6/2019 0909 by Sadaf Rae RN  Outcome: Met This Shift  Note:   Plan for discharge later this day 3/6/19     Problem: Pain - Acute:  Goal: Pain level will decrease  Description  Pain level will decrease  Outcome: Met This Shift     Problem: Breastfeeding - Ineffective:  Goal: Effective breastfeeding  Description  Effective breastfeeding  Outcome: Completed  Note:   Pt not breast feeding     Problem: Parent-Infant Attachment - Impaired:  Goal: Ability to interact appropriately with  will improve  Description  Ability to interact appropriately with  will improve  Outcome: Completed  Note:   Appropriate interaction noted with infant     Problem: Pain:  Goal: Control of chronic pain  Description  Control of chronic pain  Note:   Pt denies

## 2020-08-02 ENCOUNTER — HOSPITAL ENCOUNTER (INPATIENT)
Age: 25
LOS: 4 days | Discharge: HOME | DRG: 564 | End: 2020-08-06
Attending: EMERGENCY MEDICINE | Admitting: INTERNAL MEDICINE
Payer: COMMERCIAL

## 2020-08-02 ENCOUNTER — APPOINTMENT (OUTPATIENT)
Dept: GENERAL RADIOLOGY | Age: 25
DRG: 564 | End: 2020-08-02
Payer: COMMERCIAL

## 2020-08-02 ENCOUNTER — APPOINTMENT (OUTPATIENT)
Dept: ULTRASOUND IMAGING | Age: 25
DRG: 564 | End: 2020-08-02
Payer: COMMERCIAL

## 2020-08-02 ENCOUNTER — APPOINTMENT (OUTPATIENT)
Dept: CT IMAGING | Age: 25
DRG: 564 | End: 2020-08-02
Payer: COMMERCIAL

## 2020-08-02 PROBLEM — A41.9 SEPSIS (HCC): Status: ACTIVE | Noted: 2020-08-02

## 2020-08-02 LAB
ALBUMIN SERPL-MCNC: 3.1 G/DL (ref 3.5–5.2)
ALP BLD-CCNC: 79 U/L (ref 35–104)
ALT SERPL-CCNC: 19 U/L (ref 0–32)
AMPHETAMINE SCREEN, URINE: NOT DETECTED
ANION GAP SERPL CALCULATED.3IONS-SCNC: 16 MMOL/L (ref 7–16)
APTT: 29.1 SEC (ref 24.5–35.1)
AST SERPL-CCNC: 27 U/L (ref 0–31)
BARBITURATE SCREEN URINE: NOT DETECTED
BASOPHILS ABSOLUTE: 0 E9/L (ref 0–0.2)
BASOPHILS RELATIVE PERCENT: 0.7 % (ref 0–2)
BENZODIAZEPINE SCREEN, URINE: NOT DETECTED
BILIRUB SERPL-MCNC: 3 MG/DL (ref 0–1.2)
BUN BLDV-MCNC: 9 MG/DL (ref 6–20)
BURR CELLS: ABNORMAL
CALCIUM SERPL-MCNC: 8.1 MG/DL (ref 8.6–10.2)
CANNABINOID SCREEN URINE: NOT DETECTED
CHLORIDE BLD-SCNC: 94 MMOL/L (ref 98–107)
CO2: 20 MMOL/L (ref 22–29)
COCAINE METABOLITE SCREEN URINE: NOT DETECTED
CREAT SERPL-MCNC: 0.8 MG/DL (ref 0.5–1)
DOHLE BODIES: ABNORMAL
EOSINOPHILS ABSOLUTE: 0 E9/L (ref 0.05–0.5)
EOSINOPHILS RELATIVE PERCENT: 0.2 % (ref 0–6)
FENTANYL SCREEN, URINE: NOT DETECTED
GFR AFRICAN AMERICAN: >60
GFR NON-AFRICAN AMERICAN: >60 ML/MIN/1.73
GLUCOSE BLD-MCNC: 102 MG/DL (ref 74–99)
HCT VFR BLD CALC: 31.2 % (ref 34–48)
HEMOGLOBIN: 10.4 G/DL (ref 11.5–15.5)
INR BLD: 1.3
LACTATE DEHYDROGENASE: 224 U/L (ref 135–214)
LACTIC ACID, SEPSIS: 1.4 MMOL/L (ref 0.5–1.9)
LACTIC ACID, SEPSIS: 2.2 MMOL/L (ref 0.5–1.9)
LYMPHOCYTES ABSOLUTE: 0.59 E9/L (ref 1.5–4)
LYMPHOCYTES RELATIVE PERCENT: 13 % (ref 20–42)
Lab: NORMAL
MAGNESIUM: 1.8 MG/DL (ref 1.6–2.6)
MCH RBC QN AUTO: 29.4 PG (ref 26–35)
MCHC RBC AUTO-ENTMCNC: 33.3 % (ref 32–34.5)
MCV RBC AUTO: 88.1 FL (ref 80–99.9)
METAMYELOCYTES RELATIVE PERCENT: 0.9 % (ref 0–1)
METHADONE SCREEN, URINE: NOT DETECTED
MONOCYTES ABSOLUTE: 0.18 E9/L (ref 0.1–0.95)
MONOCYTES RELATIVE PERCENT: 3.5 % (ref 2–12)
NEUTROPHILS ABSOLUTE: 3.78 E9/L (ref 1.8–7.3)
NEUTROPHILS RELATIVE PERCENT: 82.6 % (ref 43–80)
OPIATE SCREEN URINE: NOT DETECTED
OVALOCYTES: ABNORMAL
OXYCODONE URINE: NOT DETECTED
PDW BLD-RTO: 14.9 FL (ref 11.5–15)
PHENCYCLIDINE SCREEN URINE: NOT DETECTED
PLATELET # BLD: 73 E9/L (ref 130–450)
PLATELET CONFIRMATION: NORMAL
PMV BLD AUTO: 12.2 FL (ref 7–12)
POIKILOCYTES: ABNORMAL
POLYCHROMASIA: ABNORMAL
POTASSIUM REFLEX MAGNESIUM: 2.9 MMOL/L (ref 3.5–5)
PROTHROMBIN TIME: 14.6 SEC (ref 9.3–12.4)
RBC # BLD: 3.54 E12/L (ref 3.5–5.5)
SARS-COV-2, NAAT: NOT DETECTED
SODIUM BLD-SCNC: 130 MMOL/L (ref 132–146)
TEAR DROP CELLS: ABNORMAL
TOTAL PROTEIN: 6.7 G/DL (ref 6.4–8.3)
TOXIC GRANULATION: ABNORMAL
WBC # BLD: 4.5 E9/L (ref 4.5–11.5)

## 2020-08-02 PROCEDURE — U0002 COVID-19 LAB TEST NON-CDC: HCPCS

## 2020-08-02 PROCEDURE — 87077 CULTURE AEROBIC IDENTIFY: CPT

## 2020-08-02 PROCEDURE — 83010 ASSAY OF HAPTOGLOBIN QUANT: CPT

## 2020-08-02 PROCEDURE — 96375 TX/PRO/DX INJ NEW DRUG ADDON: CPT

## 2020-08-02 PROCEDURE — 96365 THER/PROPH/DIAG IV INF INIT: CPT

## 2020-08-02 PROCEDURE — 83615 LACTATE (LD) (LDH) ENZYME: CPT

## 2020-08-02 PROCEDURE — 96366 THER/PROPH/DIAG IV INF ADDON: CPT

## 2020-08-02 PROCEDURE — 36415 COLL VENOUS BLD VENIPUNCTURE: CPT

## 2020-08-02 PROCEDURE — 80307 DRUG TEST PRSMV CHEM ANLYZR: CPT

## 2020-08-02 PROCEDURE — 71275 CT ANGIOGRAPHY CHEST: CPT

## 2020-08-02 PROCEDURE — 80053 COMPREHEN METABOLIC PANEL: CPT

## 2020-08-02 PROCEDURE — 2060000000 HC ICU INTERMEDIATE R&B

## 2020-08-02 PROCEDURE — 85610 PROTHROMBIN TIME: CPT

## 2020-08-02 PROCEDURE — 6360000002 HC RX W HCPCS: Performed by: EMERGENCY MEDICINE

## 2020-08-02 PROCEDURE — 2580000003 HC RX 258: Performed by: INTERNAL MEDICINE

## 2020-08-02 PROCEDURE — 6370000000 HC RX 637 (ALT 250 FOR IP): Performed by: EMERGENCY MEDICINE

## 2020-08-02 PROCEDURE — 85025 COMPLETE CBC W/AUTO DIFF WBC: CPT

## 2020-08-02 PROCEDURE — 71045 X-RAY EXAM CHEST 1 VIEW: CPT

## 2020-08-02 PROCEDURE — 87040 BLOOD CULTURE FOR BACTERIA: CPT

## 2020-08-02 PROCEDURE — 76830 TRANSVAGINAL US NON-OB: CPT

## 2020-08-02 PROCEDURE — 84145 PROCALCITONIN (PCT): CPT

## 2020-08-02 PROCEDURE — 6360000002 HC RX W HCPCS: Performed by: INTERNAL MEDICINE

## 2020-08-02 PROCEDURE — 83735 ASSAY OF MAGNESIUM: CPT

## 2020-08-02 PROCEDURE — 87088 URINE BACTERIA CULTURE: CPT

## 2020-08-02 PROCEDURE — 85730 THROMBOPLASTIN TIME PARTIAL: CPT

## 2020-08-02 PROCEDURE — 83605 ASSAY OF LACTIC ACID: CPT

## 2020-08-02 PROCEDURE — 87186 SC STD MICRODIL/AGAR DIL: CPT

## 2020-08-02 PROCEDURE — 99285 EMERGENCY DEPT VISIT HI MDM: CPT

## 2020-08-02 PROCEDURE — 87147 CULTURE TYPE IMMUNOLOGIC: CPT

## 2020-08-02 PROCEDURE — 2580000003 HC RX 258: Performed by: EMERGENCY MEDICINE

## 2020-08-02 PROCEDURE — 87150 DNA/RNA AMPLIFIED PROBE: CPT

## 2020-08-02 PROCEDURE — 87450 HC DIRECT STREP B ANTIGEN: CPT

## 2020-08-02 PROCEDURE — 6360000004 HC RX CONTRAST MEDICATION: Performed by: RADIOLOGY

## 2020-08-02 RX ORDER — ALBUTEROL SULFATE 2.5 MG/3ML
2.5 SOLUTION RESPIRATORY (INHALATION) EVERY 6 HOURS PRN
Status: DISCONTINUED | OUTPATIENT
Start: 2020-08-02 | End: 2020-08-06 | Stop reason: HOSPADM

## 2020-08-02 RX ORDER — SODIUM CHLORIDE 9 MG/ML
INJECTION, SOLUTION INTRAVENOUS EVERY 8 HOURS
Status: DISCONTINUED | OUTPATIENT
Start: 2020-08-03 | End: 2020-08-05 | Stop reason: ALTCHOICE

## 2020-08-02 RX ORDER — ACETAMINOPHEN 500 MG
1000 TABLET ORAL ONCE
Status: COMPLETED | OUTPATIENT
Start: 2020-08-02 | End: 2020-08-02

## 2020-08-02 RX ORDER — KETOROLAC TROMETHAMINE 30 MG/ML
30 INJECTION, SOLUTION INTRAMUSCULAR; INTRAVENOUS EVERY 6 HOURS PRN
Status: DISCONTINUED | OUTPATIENT
Start: 2020-08-02 | End: 2020-08-06 | Stop reason: HOSPADM

## 2020-08-02 RX ORDER — ACETAMINOPHEN 325 MG/1
650 TABLET ORAL EVERY 4 HOURS PRN
Status: DISCONTINUED | OUTPATIENT
Start: 2020-08-02 | End: 2020-08-06 | Stop reason: HOSPADM

## 2020-08-02 RX ORDER — ONDANSETRON 2 MG/ML
4 INJECTION INTRAMUSCULAR; INTRAVENOUS EVERY 6 HOURS PRN
Status: DISCONTINUED | OUTPATIENT
Start: 2020-08-02 | End: 2020-08-06 | Stop reason: HOSPADM

## 2020-08-02 RX ORDER — 0.9 % SODIUM CHLORIDE 0.9 %
1000 INTRAVENOUS SOLUTION INTRAVENOUS ONCE
Status: COMPLETED | OUTPATIENT
Start: 2020-08-02 | End: 2020-08-02

## 2020-08-02 RX ORDER — POTASSIUM CHLORIDE AND SODIUM CHLORIDE 900; 300 MG/100ML; MG/100ML
INJECTION, SOLUTION INTRAVENOUS CONTINUOUS
Status: DISCONTINUED | OUTPATIENT
Start: 2020-08-02 | End: 2020-08-06 | Stop reason: HOSPADM

## 2020-08-02 RX ADMIN — POTASSIUM CHLORIDE AND SODIUM CHLORIDE: 900; 300 INJECTION, SOLUTION INTRAVENOUS at 22:35

## 2020-08-02 RX ADMIN — IOPAMIDOL 80 ML: 755 INJECTION, SOLUTION INTRAVENOUS at 18:32

## 2020-08-02 RX ADMIN — POTASSIUM BICARBONATE 40 MEQ: 782 TABLET, EFFERVESCENT ORAL at 17:24

## 2020-08-02 RX ADMIN — SODIUM CHLORIDE 1000 ML: 9 INJECTION, SOLUTION INTRAVENOUS at 14:49

## 2020-08-02 RX ADMIN — WATER 2 G: 1 INJECTION INTRAMUSCULAR; INTRAVENOUS; SUBCUTANEOUS at 17:24

## 2020-08-02 RX ADMIN — PIPERACILLIN AND TAZOBACTAM 3.38 G: 3; .375 INJECTION, POWDER, LYOPHILIZED, FOR SOLUTION INTRAVENOUS at 23:52

## 2020-08-02 RX ADMIN — SODIUM CHLORIDE 1000 ML: 9 INJECTION, SOLUTION INTRAVENOUS at 17:24

## 2020-08-02 RX ADMIN — KETOROLAC TROMETHAMINE 30 MG: 30 INJECTION, SOLUTION INTRAMUSCULAR; INTRAVENOUS at 22:20

## 2020-08-02 RX ADMIN — VANCOMYCIN HYDROCHLORIDE 750 MG: 5 INJECTION, POWDER, LYOPHILIZED, FOR SOLUTION INTRAVENOUS at 22:23

## 2020-08-02 RX ADMIN — ACETAMINOPHEN 1000 MG: 500 TABLET, FILM COATED ORAL at 14:57

## 2020-08-02 RX ADMIN — AZITHROMYCIN MONOHYDRATE 500 MG: 500 INJECTION, POWDER, LYOPHILIZED, FOR SOLUTION INTRAVENOUS at 17:33

## 2020-08-02 ASSESSMENT — ENCOUNTER SYMPTOMS
EYE PAIN: 0
BACK PAIN: 0
EYE REDNESS: 0
SINUS PRESSURE: 0
COUGH: 1
VOMITING: 0
WHEEZING: 0
DIARRHEA: 0
EYE DISCHARGE: 0
ABDOMINAL DISTENTION: 0
SHORTNESS OF BREATH: 0
NAUSEA: 0
SORE THROAT: 0

## 2020-08-02 ASSESSMENT — PAIN DESCRIPTION - INTENSITY
RATING_3: 10
RATING_2: 8

## 2020-08-02 ASSESSMENT — PAIN DESCRIPTION - DESCRIPTORS
DESCRIPTORS_2: ACHING;CRAMPING
DESCRIPTORS_3: ACHING
DESCRIPTORS: ACHING;DISCOMFORT

## 2020-08-02 ASSESSMENT — PAIN SCALES - GENERAL
PAINLEVEL_OUTOF10: 10
PAINLEVEL_OUTOF10: 10
PAINLEVEL_OUTOF10: 0

## 2020-08-02 ASSESSMENT — PAIN DESCRIPTION - LOCATION
LOCATION: HAND;WRIST
LOCATION_2: BACK
LOCATION_3: ABDOMEN

## 2020-08-02 ASSESSMENT — PAIN DESCRIPTION - PROGRESSION
CLINICAL_PROGRESSION_2: NOT CHANGED
CLINICAL_PROGRESSION_3: NOT CHANGED
CLINICAL_PROGRESSION: NOT CHANGED

## 2020-08-02 ASSESSMENT — PAIN DESCRIPTION - ONSET
ONSET_3: GRADUAL
ONSET_2: SUDDEN
ONSET: SUDDEN

## 2020-08-02 ASSESSMENT — PAIN - FUNCTIONAL ASSESSMENT: PAIN_FUNCTIONAL_ASSESSMENT: PREVENTS OR INTERFERES SOME ACTIVE ACTIVITIES AND ADLS

## 2020-08-02 ASSESSMENT — PAIN DESCRIPTION - PAIN TYPE
TYPE_3: ACUTE PAIN
TYPE_2: ACUTE PAIN
TYPE: ACUTE PAIN

## 2020-08-02 ASSESSMENT — PAIN DESCRIPTION - DIRECTION: RADIATING_TOWARDS_3: BACK AND SIDES

## 2020-08-02 ASSESSMENT — PAIN DESCRIPTION - DURATION
DURATION_2: INTERMITTENT
DURATION_3: INTERMITTENT

## 2020-08-02 ASSESSMENT — PAIN DESCRIPTION - FREQUENCY: FREQUENCY: INTERMITTENT

## 2020-08-02 ASSESSMENT — PAIN DESCRIPTION - ORIENTATION
ORIENTATION_2: LOWER
ORIENTATION: LEFT

## 2020-08-02 NOTE — ED PROVIDER NOTES
This patient is status post elective  performed in [facility] 4 days ago. Yesterday, she began developed fever, body wide aches and pains, and general malaise. She reports continued mild vaginal bleeding. No known sick contacts. The history is provided by the patient. Fever   Max temp prior to arrival:  102  Temp source:  Oral  Severity:  Moderate  Onset quality:  Gradual  Duration:  3 days  Timing:  Constant  Progression:  Worsening  Chronicity:  New  Relieved by:  None tried  Worsened by:  Nothing  Ineffective treatments:  None tried  Associated symptoms: chills and cough    Associated symptoms: no chest pain, no diarrhea, no dysuria, no ear pain, no headaches, no nausea, no rash, no sore throat and no vomiting         Review of Systems   Constitutional: Positive for activity change, appetite change, chills, fatigue and fever. HENT: Negative for ear pain, sinus pressure and sore throat. Eyes: Negative for pain, discharge and redness. Respiratory: Positive for cough. Negative for shortness of breath and wheezing. Cardiovascular: Negative for chest pain. Gastrointestinal: Negative for abdominal distention, diarrhea, nausea and vomiting. Genitourinary: Positive for vaginal bleeding. Negative for dysuria and frequency. Musculoskeletal: Negative for arthralgias and back pain. Skin: Negative for rash and wound. Neurological: Negative for weakness and headaches. Hematological: Negative for adenopathy. All other systems reviewed and are negative. Physical Exam  Vitals signs and nursing note reviewed. Constitutional:       General: She is not in acute distress. Appearance: She is well-developed. HENT:      Head: Normocephalic and atraumatic. Eyes:      Pupils: Pupils are equal, round, and reactive to light. Neck:      Musculoskeletal: Normal range of motion and neck supple. Cardiovascular:      Rate and Rhythm: Regular rhythm. Tachycardia present.       Heart sounds: Normal heart sounds. No murmur. Pulmonary:      Effort: Pulmonary effort is normal. Tachypnea present. No respiratory distress. Breath sounds: Normal breath sounds. No wheezing or rales. Comments: Coarse lung sounds  Abdominal:      General: Bowel sounds are normal.      Palpations: Abdomen is soft. Tenderness: There is no abdominal tenderness. There is no guarding or rebound. Musculoskeletal:         General: No swelling. Skin:     General: Skin is warm and dry. Neurological:      Mental Status: She is alert and oriented to person, place, and time. Cranial Nerves: No cranial nerve deficit. Coordination: Coordination normal.          Procedures     MDM       5:08 PM EDT  I received a call from [place]. They are having difficulty signing off on the ultrasound. However, they report no retained products of conception, no endometrial thickening, no ovarian mass or cyst, and no free fluid in the cul-de-sac. SEPSIS EVALUATION TOOL Time of presentation: 8/2/2020  2:17 PM    SIRS CRITERIA: (2 required)  Temperature <36 or >38  Yes  Heart rate >90    Yes  RR >20 or PCO2 <32   Yes  WBC >12 or <4 or >10% bands No    SEPSIS CRITERIA: (Both required)  Two or more of the above  Yes  Suspected source(s) of infection Pneumonia    ANTIBIOTIC SELECTION RATIONAL  [id number] Antibiogram    ANTIBIOTIC SELECTION LIMITATIONS  Allergy and Documented failure    LACTIC ACID    Initial     2.2  Follow up - if initial abnormal (>2) Pending    SEVERE SEPSIS CRITERIA: (All 3 of the following criteria needed)  1. SIRS Criteria met   Yes  2. Sepsis Criteria met   Yes  3. Organ dysfunction as evidenced by any one of the following No   A. Systolic PG<93 or MAP< 65 or SBP decrease by >40 from baseline   B. Creatinine >2.0, or urine output <0.5 mL/kg/hr for 2 hours   C. Bilirubin > 2 mg/dL   D. Platelet count < 841,393   E. INR > 1.5 or aPTT > 60 sec   F.  Lactate > 2 mmol/L    Section requirements: To be done within 3 hours of presentation (1417 + 3 hours):  1. Initial lactate measured  2. Broad spectrum antibiotics administered  3. Blood cultures drawn prior to antibiotics  4. Repeat lactate if initial level >2 (6 hour requirement)    SEPTIC SHOCK CRITERIA: (Both of the following must be met)  1. Severe sepsis criteria met   No   AND either of the followin. Lactate > 4 mmol/ L on any reading No    OR      Tissue hypoperfusion after crystalloids as evidenced by either: No   A. SBP <90 or MAP <65                        Or   B. Decrease in SBP by > 40 points from baseline              --------------------------------------------- PAST HISTORY ---------------------------------------------  Past Medical History:  has a past medical history of Abnormal Pap smear, False labor before 37 completed weeks of gestation in third trimester, Intrauterine growth restriction affecting care of mother, antepartum, third trimester, not applicable or unspecified fetus, Iron deficiency anemia, and  (spontaneous vaginal delivery). Past Surgical History:  has no past surgical history on file. Social History:  reports that she has been smoking. She has never used smokeless tobacco. She reports that she does not drink alcohol or use drugs. Family History: family history is not on file. The patients home medications have been reviewed. Allergies: Patient has no known allergies.     -------------------------------------------------- RESULTS -------------------------------------------------    Lab  Results for orders placed or performed during the hospital encounter of 20   Lactate, Sepsis   Result Value Ref Range    Lactic Acid, Sepsis 2.2 (H) 0.5 - 1.9 mmol/L   Lactate, Sepsis   Result Value Ref Range    Lactic Acid, Sepsis 1.4 0.5 - 1.9 mmol/L   CBC auto differential   Result Value Ref Range    WBC 4.5 4.5 - 11.5 E9/L    RBC 3.54 3.50 - 5.50 E12/L    Hemoglobin 10.4 (L) 11.5 - 15.5 g/dL Hematocrit 31.2 (L) 34.0 - 48.0 %    MCV 88.1 80.0 - 99.9 fL    MCH 29.4 26.0 - 35.0 pg    MCHC 33.3 32.0 - 34.5 %    RDW 14.9 11.5 - 15.0 fL    Platelets 73 (L) 085 - 450 E9/L    MPV 12.2 (H) 7.0 - 12.0 fL    Neutrophils % 82.6 (H) 43.0 - 80.0 %    Lymphocytes % 13.0 (L) 20.0 - 42.0 %    Monocytes % 3.5 2.0 - 12.0 %    Eosinophils % 0.2 0.0 - 6.0 %    Basophils % 0.7 0.0 - 2.0 %    Neutrophils Absolute 3.78 1.80 - 7.30 E9/L    Lymphocytes Absolute 0.59 (L) 1.50 - 4.00 E9/L    Monocytes Absolute 0.18 0.10 - 0.95 E9/L    Eosinophils Absolute 0.00 (L) 0.05 - 0.50 E9/L    Basophils Absolute 0.00 0.00 - 0.20 E9/L    Metamyelocytes Relative 0.9 0.0 - 1.0 %    Toxic Granulation 1+     Dohle Bodies 1+     Polychromasia 1+     Poikilocytes 2+     Valdez Cells 2+     Ovalocytes 1+     Tear Drop Cells 1+    APTT   Result Value Ref Range    aPTT 29.1 24.5 - 35.1 sec   Protime-INR   Result Value Ref Range    Protime 14.6 (H) 9.3 - 12.4 sec    INR 1.3    Comprehensive Metabolic Panel w/ Reflex to MG   Result Value Ref Range    Sodium 130 (L) 132 - 146 mmol/L    Potassium reflex Magnesium 2.9 (L) 3.5 - 5.0 mmol/L    Chloride 94 (L) 98 - 107 mmol/L    CO2 20 (L) 22 - 29 mmol/L    Anion Gap 16 7 - 16 mmol/L    Glucose 102 (H) 74 - 99 mg/dL    BUN 9 6 - 20 mg/dL    CREATININE 0.8 0.5 - 1.0 mg/dL    GFR Non-African American >60 >=60 mL/min/1.73    GFR African American >60     Calcium 8.1 (L) 8.6 - 10.2 mg/dL    Total Protein 6.7 6.4 - 8.3 g/dL    Alb 3.1 (L) 3.5 - 5.2 g/dL    Total Bilirubin 3.0 (H) 0.0 - 1.2 mg/dL    Alkaline Phosphatase 79 35 - 104 U/L    ALT 19 0 - 32 U/L    AST 27 0 - 31 U/L   COVID-19   Result Value Ref Range    SARS-CoV-2, NAAT Not Detected Not Detected   Platelet Confirmation   Result Value Ref Range    Platelet Confirmation CONFIRMED    Magnesium   Result Value Ref Range    Magnesium 1.8 1.6 - 2.6 mg/dL       Radiology  XR CHEST PORTABLE   Final Result   Hazy airspace opacities throughout both lungs.   These may be on the basis of   an infectious or inflammatory pneumonitis. US NON OB TRANSVAGINAL    (Results Pending)       ------------------------- NURSING NOTES AND VITALS REVIEWED ---------------------------  Date / Time Roomed:  8/2/2020  2:17 PM  ED Bed Assignment:  16/16    The nursing notes within the ED encounter and vital signs as below have been reviewed. Patient Vitals for the past 24 hrs:   BP Temp Temp src Pulse Resp SpO2 Weight   08/02/20 1631 (!) 102/58 100.8 °F (38.2 °C) -- 123 28 100 % --   08/02/20 1414 -- 102.9 °F (39.4 °C) Oral 163 18 98 % 110 lb (49.9 kg)   08/02/20 1412 -- 101.5 °F (38.6 °C) Temporal -- -- -- --       Oxygen Saturation Interpretation: Normal      ------------------------------------------ PROGRESS NOTES ------------------------------------------  Re-evaluation(s):  Time: 1622. Patients symptoms show no change  Repeat physical examination is not changed    Time: 1705. Patients symptoms are improving  Repeat physical examination is improved, . I have spoken with the patient and discussed todays results, in addition to providing specific details for the plan of care and counseling regarding the diagnosis and prognosis. Their questions are answered at this time and they are agreeable with the plan.      --------------------------------- ADDITIONAL PROVIDER NOTES ---------------------------------  Consultations:  5:31 PM EDT  Call to Dr. James Ramos for admission. Discussed case. The patient. He does request pulmonary CT to evaluate for amniotic PE. Orders placed. This patient's ED course included: a personal history and physicial examination, multiple bedside re-evaluations, IV medications, cardiac monitoring, continuous pulse oximetry and complex medical decision making and emergency management    This patient has improved during their ED course.     Please note that the withdrawal or failure to initiate urgent interventions for this patient would likely result in a life threatening deterioration or permanent disability. Accordingly this patient received 40 minutes of critical care time, excluding separately billable procedures. Clinical Impression  1. Severe sepsis ([address]    2. Pneumonia of both lungs due to infectious organism, unspecified part of lung          Disposition  Patient's disposition: Admit to [facility]  Patient's condition is stable.        Payton Lebron [place]  08/02/20 1905

## 2020-08-03 ENCOUNTER — APPOINTMENT (OUTPATIENT)
Dept: CT IMAGING | Age: 25
DRG: 564 | End: 2020-08-03
Payer: COMMERCIAL

## 2020-08-03 ENCOUNTER — ANESTHESIA EVENT (OUTPATIENT)
Dept: ENDOSCOPY | Age: 25
DRG: 564 | End: 2020-08-03
Payer: COMMERCIAL

## 2020-08-03 LAB
ABO/RH: NORMAL
ACINETOBACTER BAUMANNII BY PCR: NOT DETECTED
ADENOVIRUS BY PCR: NOT DETECTED
ALBUMIN SERPL-MCNC: 2.3 G/DL (ref 3.5–5.2)
ALP BLD-CCNC: 63 U/L (ref 35–104)
ALT SERPL-CCNC: 12 U/L (ref 0–32)
ANION GAP SERPL CALCULATED.3IONS-SCNC: 13 MMOL/L (ref 7–16)
ANTIBODY SCREEN: NORMAL
AST SERPL-CCNC: 20 U/L (ref 0–31)
ATYPICAL LYMPHOCYTE RELATIVE PERCENT: 0.9 % (ref 0–4)
BASOPHILS ABSOLUTE: 0 E9/L (ref 0–0.2)
BASOPHILS RELATIVE PERCENT: 0.9 % (ref 0–2)
BILIRUB SERPL-MCNC: 1.5 MG/DL (ref 0–1.2)
BORDETELLA PARAPERTUSSIS BY PCR: NOT DETECTED
BORDETELLA PERTUSSIS BY PCR: NOT DETECTED
BOTTLE TYPE: ABNORMAL
BUN BLDV-MCNC: 13 MG/DL (ref 6–20)
BURR CELLS: ABNORMAL
CALCIUM SERPL-MCNC: 7.3 MG/DL (ref 8.6–10.2)
CANDIDA ALBICANS BY PCR: NOT DETECTED
CANDIDA GLABRATA BY PCR: NOT DETECTED
CANDIDA KRUSEI BY PCR: NOT DETECTED
CANDIDA PARAPSILOSIS BY PCR: NOT DETECTED
CANDIDA TROPICALIS BY PCR: NOT DETECTED
CHLAMYDOPHILIA PNEUMONIAE BY PCR: NOT DETECTED
CHLORIDE BLD-SCNC: 104 MMOL/L (ref 98–107)
CO2: 19 MMOL/L (ref 22–29)
CORONAVIRUS 229E BY PCR: NOT DETECTED
CORONAVIRUS HKU1 BY PCR: NOT DETECTED
CORONAVIRUS NL63 BY PCR: NOT DETECTED
CORONAVIRUS OC43 BY PCR: NOT DETECTED
CREAT SERPL-MCNC: 0.7 MG/DL (ref 0.5–1)
DOHLE BODIES: ABNORMAL
ENTEROBACTER CLOACAE COMPLEX BY PCR: NOT DETECTED
ENTEROBACTERALES BY PCR: NOT DETECTED
ENTEROCOCCUS BY PCR: NOT DETECTED
EOSINOPHILS ABSOLUTE: 0.09 E9/L (ref 0.05–0.5)
EOSINOPHILS RELATIVE PERCENT: 2.6 % (ref 0–6)
ESCHERICHIA COLI BY PCR: NOT DETECTED
FERRITIN: 287 NG/ML
FOLATE: 8.2 NG/ML (ref 4.8–24.2)
GFR AFRICAN AMERICAN: >60
GFR NON-AFRICAN AMERICAN: >60 ML/MIN/1.73
GLUCOSE BLD-MCNC: 102 MG/DL (ref 74–99)
HAEMOPHILUS INFLUENZAE BY PCR: NOT DETECTED
HAPTOGLOBIN: 253 MG/DL (ref 30–200)
HCT VFR BLD CALC: 24.5 % (ref 34–48)
HCT VFR BLD CALC: 26.5 % (ref 34–48)
HEMOGLOBIN: 8.3 G/DL (ref 11.5–15.5)
HEMOGLOBIN: 9 G/DL (ref 11.5–15.5)
HUMAN METAPNEUMOVIRUS BY PCR: NOT DETECTED
HUMAN RHINOVIRUS/ENTEROVIRUS BY PCR: NOT DETECTED
HYPOCHROMIA: ABNORMAL
INFLUENZA A BY PCR: NOT DETECTED
INFLUENZA B BY PCR: NOT DETECTED
IRON SATURATION: 7 % (ref 15–50)
IRON: 17 MCG/DL (ref 37–145)
KLEBSIELLA OXYTOCA BY PCR: NOT DETECTED
KLEBSIELLA PNEUMONIAE GROUP BY PCR: NOT DETECTED
L. PNEUMOPHILA SEROGP 1 UR AG: NORMAL
LISTERIA MONOCYTOGENES BY PCR: NOT DETECTED
LV EF: 56 %
LVEF MODALITY: NORMAL
LYMPHOCYTES ABSOLUTE: 0.35 E9/L (ref 1.5–4)
LYMPHOCYTES RELATIVE PERCENT: 8.8 % (ref 20–42)
MAGNESIUM: 2.1 MG/DL (ref 1.6–2.6)
MCH RBC QN AUTO: 29.9 PG (ref 26–35)
MCHC RBC AUTO-ENTMCNC: 33.9 % (ref 32–34.5)
MCV RBC AUTO: 88.1 FL (ref 80–99.9)
METAMYELOCYTES RELATIVE PERCENT: 0.9 % (ref 0–1)
MONOCYTES ABSOLUTE: 0.1 E9/L (ref 0.1–0.95)
MONOCYTES RELATIVE PERCENT: 2.6 % (ref 2–12)
MYCOPLASMA PNEUMONIAE BY PCR: NOT DETECTED
NEISSERIA MENINGITIDIS BY PCR: NOT DETECTED
NEUTROPHILS ABSOLUTE: 2.98 E9/L (ref 1.8–7.3)
NEUTROPHILS RELATIVE PERCENT: 84.2 % (ref 43–80)
ORDER NUMBER: ABNORMAL
OVALOCYTES: ABNORMAL
PARAINFLUENZA VIRUS 1 BY PCR: NOT DETECTED
PARAINFLUENZA VIRUS 2 BY PCR: NOT DETECTED
PARAINFLUENZA VIRUS 3 BY PCR: NOT DETECTED
PARAINFLUENZA VIRUS 4 BY PCR: NOT DETECTED
PATHOLOGIST REVIEW: NORMAL
PDW BLD-RTO: 15.1 FL (ref 11.5–15)
PHOSPHORUS: 1.9 MG/DL (ref 2.5–4.5)
PLATELET # BLD: 54 E9/L (ref 130–450)
PLATELET CONFIRMATION: NORMAL
PMV BLD AUTO: 12.7 FL (ref 7–12)
POIKILOCYTES: ABNORMAL
POLYCHROMASIA: ABNORMAL
POTASSIUM SERPL-SCNC: 3.5 MMOL/L (ref 3.5–5)
PROCALCITONIN: 4.64 NG/ML (ref 0–0.08)
PROTEUS BY PCR: NOT DETECTED
PSEUDOMONAS AERUGINOSA BY PCR: NOT DETECTED
RBC # BLD: 2.78 E12/L (ref 3.5–5.5)
RESPIRATORY SYNCYTIAL VIRUS BY PCR: NOT DETECTED
SCHISTOCYTES: ABNORMAL
SERRATIA MARCESCENS BY PCR: NOT DETECTED
SODIUM BLD-SCNC: 136 MMOL/L (ref 132–146)
SOURCE OF BLOOD CULTURE: ABNORMAL
STAPHYLOCOCCUS AUREUS BY PCR: NOT DETECTED
STAPHYLOCOCCUS SPECIES BY PCR: NOT DETECTED
STREP PNEUMONIAE ANTIGEN, URINE: NORMAL
STREPTOCOCCUS AGALACTIAE BY PCR: NOT DETECTED
STREPTOCOCCUS PNEUMONIAE BY PCR: NOT DETECTED
STREPTOCOCCUS PYOGENES  BY PCR: DETECTED
STREPTOCOCCUS SPECIES BY PCR: DETECTED
TEAR DROP CELLS: ABNORMAL
TOTAL IRON BINDING CAPACITY: 239 MCG/DL (ref 250–450)
TOTAL PROTEIN: 5.2 G/DL (ref 6.4–8.3)
VACUOLATED NEUTROPHILS: ABNORMAL
VITAMIN B-12: >2000 PG/ML (ref 211–946)
WBC # BLD: 3.5 E9/L (ref 4.5–11.5)

## 2020-08-03 PROCEDURE — 36415 COLL VENOUS BLD VENIPUNCTURE: CPT

## 2020-08-03 PROCEDURE — 82728 ASSAY OF FERRITIN: CPT

## 2020-08-03 PROCEDURE — 82607 VITAMIN B-12: CPT

## 2020-08-03 PROCEDURE — 85014 HEMATOCRIT: CPT

## 2020-08-03 PROCEDURE — 74177 CT ABD & PELVIS W/CONTRAST: CPT

## 2020-08-03 PROCEDURE — 93306 TTE W/DOPPLER COMPLETE: CPT

## 2020-08-03 PROCEDURE — 86901 BLOOD TYPING SEROLOGIC RH(D): CPT

## 2020-08-03 PROCEDURE — 83540 ASSAY OF IRON: CPT

## 2020-08-03 PROCEDURE — 85018 HEMOGLOBIN: CPT

## 2020-08-03 PROCEDURE — 86850 RBC ANTIBODY SCREEN: CPT

## 2020-08-03 PROCEDURE — 6370000000 HC RX 637 (ALT 250 FOR IP): Performed by: INTERNAL MEDICINE

## 2020-08-03 PROCEDURE — 2580000003 HC RX 258: Performed by: INTERNAL MEDICINE

## 2020-08-03 PROCEDURE — 2060000000 HC ICU INTERMEDIATE R&B

## 2020-08-03 PROCEDURE — 6360000004 HC RX CONTRAST MEDICATION: Performed by: RADIOLOGY

## 2020-08-03 PROCEDURE — 73200 CT UPPER EXTREMITY W/O DYE: CPT

## 2020-08-03 PROCEDURE — 0100U HC RESPIRPTHGN MULT REV TRANS & AMP PRB TECH 21 TRGT: CPT

## 2020-08-03 PROCEDURE — 82746 ASSAY OF FOLIC ACID SERUM: CPT

## 2020-08-03 PROCEDURE — 83735 ASSAY OF MAGNESIUM: CPT

## 2020-08-03 PROCEDURE — 6360000002 HC RX W HCPCS: Performed by: INTERNAL MEDICINE

## 2020-08-03 PROCEDURE — 86900 BLOOD TYPING SEROLOGIC ABO: CPT

## 2020-08-03 PROCEDURE — 84100 ASSAY OF PHOSPHORUS: CPT

## 2020-08-03 PROCEDURE — 80053 COMPREHEN METABOLIC PANEL: CPT

## 2020-08-03 PROCEDURE — 85025 COMPLETE CBC W/AUTO DIFF WBC: CPT

## 2020-08-03 PROCEDURE — 83550 IRON BINDING TEST: CPT

## 2020-08-03 RX ADMIN — IOHEXOL 50 ML: 240 INJECTION, SOLUTION INTRATHECAL; INTRAVASCULAR; INTRAVENOUS; ORAL at 17:40

## 2020-08-03 RX ADMIN — IOPAMIDOL 75 ML: 755 INJECTION, SOLUTION INTRAVENOUS at 17:41

## 2020-08-03 RX ADMIN — ACETAMINOPHEN 650 MG: 325 TABLET, FILM COATED ORAL at 11:48

## 2020-08-03 RX ADMIN — SODIUM CHLORIDE: 9 INJECTION, SOLUTION INTRAVENOUS at 20:30

## 2020-08-03 RX ADMIN — VANCOMYCIN HYDROCHLORIDE 750 MG: 5 INJECTION, POWDER, LYOPHILIZED, FOR SOLUTION INTRAVENOUS at 21:12

## 2020-08-03 RX ADMIN — KETOROLAC TROMETHAMINE 30 MG: 30 INJECTION, SOLUTION INTRAMUSCULAR; INTRAVENOUS at 21:10

## 2020-08-03 RX ADMIN — POTASSIUM CHLORIDE AND SODIUM CHLORIDE: 900; 300 INJECTION, SOLUTION INTRAVENOUS at 11:48

## 2020-08-03 RX ADMIN — PIPERACILLIN AND TAZOBACTAM 3.38 G: 3; .375 INJECTION, POWDER, LYOPHILIZED, FOR SOLUTION INTRAVENOUS at 08:38

## 2020-08-03 RX ADMIN — ACETAMINOPHEN 650 MG: 325 TABLET, FILM COATED ORAL at 16:06

## 2020-08-03 RX ADMIN — VANCOMYCIN HYDROCHLORIDE 750 MG: 5 INJECTION, POWDER, LYOPHILIZED, FOR SOLUTION INTRAVENOUS at 10:12

## 2020-08-03 RX ADMIN — PIPERACILLIN AND TAZOBACTAM 3.38 G: 3; .375 INJECTION, POWDER, LYOPHILIZED, FOR SOLUTION INTRAVENOUS at 16:06

## 2020-08-03 RX ADMIN — PIPERACILLIN AND TAZOBACTAM 3.38 G: 3; .375 INJECTION, POWDER, LYOPHILIZED, FOR SOLUTION INTRAVENOUS at 23:18

## 2020-08-03 RX ADMIN — ACETAMINOPHEN 650 MG: 325 TABLET, FILM COATED ORAL at 23:13

## 2020-08-03 RX ADMIN — KETOROLAC TROMETHAMINE 30 MG: 30 INJECTION, SOLUTION INTRAMUSCULAR; INTRAVENOUS at 06:02

## 2020-08-03 RX ADMIN — SODIUM CHLORIDE: 9 INJECTION, SOLUTION INTRAVENOUS at 11:48

## 2020-08-03 ASSESSMENT — PAIN DESCRIPTION - PAIN TYPE
TYPE: ACUTE PAIN
TYPE_3: ACUTE PAIN
TYPE_2: ACUTE PAIN
TYPE_3: ACUTE PAIN

## 2020-08-03 ASSESSMENT — PAIN SCALES - GENERAL
PAINLEVEL_OUTOF10: 9
PAINLEVEL_OUTOF10: 9
PAINLEVEL_OUTOF10: 8
PAINLEVEL_OUTOF10: 9
PAINLEVEL_OUTOF10: 9
PAINLEVEL_OUTOF10: 8
PAINLEVEL_OUTOF10: 9
PAINLEVEL_OUTOF10: 9
PAINLEVEL_OUTOF10: 6
PAINLEVEL_OUTOF10: 4
PAINLEVEL_OUTOF10: 9

## 2020-08-03 ASSESSMENT — PAIN DESCRIPTION - PROGRESSION
CLINICAL_PROGRESSION_3: GRADUALLY IMPROVING
CLINICAL_PROGRESSION_2: GRADUALLY IMPROVING

## 2020-08-03 ASSESSMENT — PAIN DESCRIPTION - ORIENTATION
ORIENTATION: LEFT
ORIENTATION_2: LOWER
ORIENTATION_3: OTHER (COMMENT)
ORIENTATION: LEFT

## 2020-08-03 ASSESSMENT — PAIN DESCRIPTION - LOCATION
LOCATION_3: ABDOMEN
LOCATION_3: ABDOMEN
LOCATION: HAND
LOCATION: HAND;WRIST
LOCATION_2: BACK

## 2020-08-03 ASSESSMENT — PAIN DESCRIPTION - DESCRIPTORS
DESCRIPTORS_2: ACHING
DESCRIPTORS_3: CRAMPING;ACHING
DESCRIPTORS_3: ACHING
DESCRIPTORS: ACHING;DISCOMFORT

## 2020-08-03 ASSESSMENT — PAIN DESCRIPTION - INTENSITY
RATING_3: 4
RATING_2: 9
RATING_3: 9

## 2020-08-03 ASSESSMENT — PAIN DESCRIPTION - DURATION: DURATION_2: INTERMITTENT

## 2020-08-03 ASSESSMENT — LIFESTYLE VARIABLES: SMOKING_STATUS: 1

## 2020-08-03 NOTE — PLAN OF CARE
Problem: Pain:  Goal: Pain level will decrease  Description: Pain level will decrease  Outcome: Not Met This Shift  Goal: Control of acute pain  Description: Control of acute pain  Outcome: Not Met This Shift  Goal: Control of chronic pain  Description: Control of chronic pain  Outcome: Not Met This Shift   Patient still c/o body aches and L. Lower arm and hand pain. Toradol Q6 hours is being used to manage pain. Ice pack applied.

## 2020-08-03 NOTE — CONSULTS
[facility]  Consult Note    [address] [facility] [address]  [place], [address]  Phone [phone]   [phone]      Admit Date: 2020  2:17 PM  Pt Name: Kaveh Minor  MRN: [id number]  : 1995  Reason for Consult:    Chief Complaint   Patient presents with    Generalized Body Aches    Fever     102.9 oral triage no tylenol today      Requesting Physician:  Lyssa Merida DO  PCP: Keisha Werner MD  History Obtained From:  patient  ID consulted for Sepsis [facility]) [A41.9]  on hospital day 1  CHIEF COMPLAINT       Chief Complaint   Patient presents with    Generalized Body Aches    Fever     102.9 oral triage no tylenol today      Neal Nelson is a 21 y.o. female who presents with significant past medical history of  has a past medical history of Abnormal Pap smear, False labor before 37 completed weeks of gestation in third trimester, Intrauterine growth restriction affecting care of mother, antepartum, third trimester, not applicable or unspecified fetus, Iron deficiency anemia, and  (spontaneous vaginal delivery). who presents with   Chief Complaint   Patient presents with    Generalized Body Aches    Fever     102.9 oral triage no tylenol today      ED TRIAGEVITALS  BP: (!) 90/55, Temp: 98.4 °F (36.9 °C), Pulse: 107, Resp: 16, SpO2: 100 %  HPI  Pt had elective  in [place] 4 days PTA. pt comes in wit hf/c/myalgia from home  No dysuria or hematuria   She has abd pain   covid NEG     Bqrb773.9  Cr0.8  Wbc4.5 plt73  Pt received ceftriaxone/azithromycin   Currently on piptazo/vanco  Blood cx S. Pyogenes +  CT Chest No pulmonary emboli. No hilar or mediastinal mass was noted.  No hilar or mediastinal   lymphadenopathy were identified. Discoid atelectasis in the left lower lobe.     REVIEW OF SYSTEMS    (2-9 systems for level 4, 10 or more for level 5)     REVIEW OFSYSTEMS:    CONSTITUTIONAL: fever, chills, no weight loss  ALLERGIES:    No urticaria, hay fever,    EYES:     No blurry vision, loss of vision,eye pain  ENT:      No hearing loss, sore throat  CARDIOVASCULAR:  No chest pain or palpitations  RESPIRATORY:   No cough, sob  ENDOCRINE:    No increase thirst, urination   HEME-LYMPH:   No easy bruising or bleeding  GI:     No nausea, vomiting or diarrhea  :     No urinary complaints  NEURO:    No seizures, stroke, HA  MUSCULOSKELETAL:   muscle aches or pain, no jointpain  SKIN:     No rash or itch  PSYCH:    No depression or anxiety    Medications Prior to Admission: ibuprofen (ADVIL;MOTRIN) 800 MG tablet, Take 1 tablet by mouth every 6 hours as needed for Pain  Prenatal Vit-Fe Fumarate-FA (PRENATAL 1+1 PO), Take 1 tablet by mouth'  CURRENT MEDICATIONS     Current Facility-Administered Medications:     0.9% NaCl with KCl 40 mEq infusion, , Intravenous, Continuous, Jinnie Median, DO, Last Rate: 100 mL/hr at 08/02/20 2235    albuterol (PROVENTIL) nebulizer solution 2.5 mg, 2.5 mg, Nebulization, Q6H PRN, Jinnie Median, DO    [facility]) injection 4 mg, 4 mg, Intravenous, Q6H PRN, Jinnie Median, DO    acetaminophen (TYLENOL) tablet 650 mg, 650 mg, Oral, Q4H PRN, Jinnie Median, DO    ketorolac (TORADOL) injection 30 mg, 30 mg, Intravenous, Q6H PRN, Jinnie Median, DO, 30 mg at 08/03/20 0602    piperacillin-tazobactam (ZOSYN) 3.375 g in dextrose 5 % 50 mL IVPB extended infusion (mini-bag), 3.375 g, Intravenous, Q8H, Stopped at 08/03/20 0326 **AND** 0.9 % sodium chloride infusion, , Intravenous, Q8H, Jinnie Median, DO    vancomycin (VANCOCIN) 750 mg in dextrose 5 % 250 mL IVPB, 750 mg, Intravenous, Q12H, Jinnie Median, DO, Stopped at 08/02/20 2350  ALLERGIES     Patient has no known allergies. There is no immunization history for the selected administration types on file for this patient.   PAST MEDICAL HISTORY     Past Medical History:   Diagnosis Date    Abnormal Pap smear     False labor before 20 1414 20 1414 20 1414 20 1414   (!) 102/58 101.5 °F (38.6 °C) Temporal 163 18 98 % 5' 1\" (1.549 m) 110 lb (49.9 kg)     Vitals:    Vitals:    20 1730 20 1943 20 0330   BP: 100/64 94/66 103/63 (!) 90/55   Pulse: 118 120 111 107   Resp:    Temp: 98.7 °F (37.1 °C) 98.6 °F (37 °C) 97.9 °F (36.6 °C) 98.4 °F (36.9 °C)   TempSrc: Oral Oral Infrared Oral   SpO2: 100% 100%     Weight:   110 lb (49.9 kg)    Height:   5' 1\" (1.549 m)      Physical Exam   Constitutional/General: Alert and oriented, NAD  Head: NC/AT  Eyes: PERRL, EOMI  Mouth: Normal mucosa, no thrush   Neck: Supple, full ROM,    Pulmonary: Lungs clear to auscultation bilaterally. Not in respiratory distress  Cardiovascular:  tachy   rate and rhythm, no murmurs, gallops, or rubs. Abdomen: Soft, + BS.   distension. tender. Extremities: Moves all extremities x 4. Warm and well perfused  Pulses:  Distal pulses intact  Skin: Warm and dry without rash  Neurologic:    No focal deficits  Psych: Normal Affect     DIAGNOSTICRESULTS   RADIOLOGY:   Us Non Ob Transvaginal    Result Date: 2020  EXAMINATION: PELVIC ULTRASOUND 2020 TECHNIQUE: Transvaginal pelvic ultrasound was performed. COMPARISON: None HISTORY: ORDERING SYSTEM PROVIDED HISTORY: sepsis, eval for retained products following elective  TECHNOLOGIST PROVIDED HISTORY: Reason for exam:->sepsis, eval for retained products following elective  FINDINGS: Measurements: Uterus: The uterus was enlarged measuring 11.7 x 7.4 x 8.0 cm Endometrial stripe: Normal in thickness measuring 8.8 mm. Right Ovary: Normal in size measuring 4.2 x 2.4 x 2.2 cm with normal appearing follicles. Left Ovary: Normal in size measuring 3.2 x 2.7 x 2.5 cm with normal appearing follicles. Ultrasound Findings: Uterus: Uterus demonstrates normal myometrial echotexture. I do not see any retained products of conception.  Endometrial caliber. Mediastinum: No hilar or mediastinal lymphadenopathy were noted. Lungs/pleura: Discoid atelectasis was noted in the left lower lobe. Upper Abdomen: No focal hepatic mass or dilated hepatic biliary ducts were noted. Soft Tissues/Bones: No acute bone or soft tissue abnormality. No pulmonary emboli. No hilar or mediastinal mass was noted. No hilar or mediastinal lymphadenopathy were identified. Discoid atelectasis in the left lower lobe. LABS  Recent Labs     08/02/20 1453 08/03/20  0558   WBC 4.5 3.5*   HGB 10.4* 8.3*   HCT 31.2* 24.5*   MCV 88.1 88.1   PLT 73* 54*     Recent Labs     08/02/20 1453 08/03/20  0558   * 136   K 2.9* 3.5   CL 94* 104   CO2 20* 19*   BUN 9 13   CREATININE 0.8 0.7   GFRAA >60 >60   LABGLOM >60 >60   GLUCOSE 102* 102*   PROT 6.7 5.2*   LABALBU 3.1* 2.3*   CALCIUM 8.1* 7.3*   BILITOT 3.0* 1.5*   ALKPHOS 79 63   AST 27 20   ALT 19 12        Lab Results   Component Value Date    COVID19 Not Detected 08/02/2020     COVID-19/YOUSIF-COV2 LABS  Recent Labs     08/02/20 1453 08/02/20 2058 08/03/20  0558   LDH  --  224*  --    INR 1.3  --   --    PROTIME 14.6*  --   --    AST 27  --  20   ALT 19  --  12           MICROBIOLOGY:     Patient is a 21 y.o. female who presented with   Chief Complaint   Patient presents with    Generalized Body Aches    Fever     102.9 oral triage no tylenol today         FINAL IMPRESSION      sepsis  pancytopenia  1. Severe sepsis ([address]    2. Pneumonia of both lungs due to infectious organism, unspecified part of lung        S. pyogenes septicemia   covid screen neg    piperacillin-tazobactam (ZOSYN) 3.375 g in dextrose 5 % 50 mL IVPB extended infusion (mini-bag), Q8H  vancomycin (VANCOCIN) 750 mg in dextrose 5 % 250 mL IVPB, Q12H    Repeat blood cx  TTE    Sed rate/crp    Imaging and labs were reviewed per medical records and any ID pertinent labs were addressed with the patient.      The patient/FAMILY  was educated about the diagnosis,

## 2020-08-03 NOTE — PROGRESS NOTES
Pharmacy Consultation Note  (Antibiotic Dosing and Monitoring)    Initial consult date: 8/2/2020  Consulting physician: Dr. Danii Leavitt  Drug(s): Vancomycin  Indication: Bacteremia    Ht Readings from Last 1 Encounters:   08/02/20 5' 1\" (1.549 m)     Wt Readings from Last 1 Encounters:   08/02/20 110 lb (49.9 kg)     Age/  Gender IBW DW  Allergy Information   21 y.o.   female 47.8 kg 49.9 kg  Patient has no known allergies. Date  Tmax WBC BUN/CR UOP  (mL/kg/hr) Drug/Dose Time   Given Level(s)   (Time) Comments   8/2  (#1) 102.9 4.5 9/0.8 -- Vancomycin 750 mg IV q12hr 2223     8/3  (#2) afebrile 3.5 13/0.7 -- Vancomycin 750 mg IV q12hr 1012  <2200>     8/4  (#3)      <1000> Trough @ <0930> =       (#4)             (#5)             (#6)             (#7)             Estimated Creatinine Clearance: 93 mL/min (based on SCr of 0.7 mg/dL). UOP over the past 24 hours:       Intake/Output Summary (Last 24 hours) at 8/3/2020 1256  Last data filed at 8/3/2020 1110  Gross per 24 hour   Intake 3438.33 ml   Output --   Net 3438.33 ml       Other anti-infectives: Anti-infective Dose Date Initiated Date Stopped   Azithromycin  500 mg IV x 1 8/2 8/2   Ceftriaxone 2g IV x 1 8/2 8/2   Pip/tazo 3.375g IV q8hr 8/2      Cultures:  available culture and sensitivity results were reviewed in EPIC  Cultures sent and are pending. Culture Date Result    Strep/legionella urine antigen 8/2 Negative   Urine cx 8/2    Respiratory panel 8/2 Not detected   Blood cx 1 8/2 GPC in chains   Blood cx 2 8/2 GPC in chains   COVID-19 8/2 Not detected     Assessment:  · Consulted by Dr. Danii Leavitt to dose/monitor vancomycin  · Goal trough level:  15-20 mcg/mL  · Pt is a 21 yoF who presented from home with fever and abdominal pain. Blood cultures positive for GPC in chains, Strep species by PCR. · Serum creatinine today: 0.7 mg/dL; CrCl ~ 93 mL/min; baseline Scr unknown    Plan:  · Vancomycin 750 mg IV q12hr  · Trough tomorrow @ 0930.  Hold dose for level > 20 mcg/mL  · Follow renal function  · Pharmacist will follow and monitor/adjust dosing as necessary      Thank you for the consult,    Lucia Neff, PharmD, BCPS 8/3/2020 12:56 PM   Ext: [phone]

## 2020-08-03 NOTE — H&P
Department of Internal Medicine  History and Physical Examination     Primary Care Physician: Juli Mix MD   Admitting Physician:  Derek Peoples DO  Admission date and time: 2020  2:17 PM    Room:  [facility]0624-  Admitting diagnosis: Sepsis [facility]) [A41.9]    Patient Name: Dona Cueto  MRN: [id number]    Date of Service: 8/3/2020     Chief Complaint: Fever    HISTORY OF PRESENT ILLNESS:    Juliano Ballesteros is a 43-year-old -American female who presented to the emergency department with complaint of fever. Patient states that she underwent elective  at 15 weeks gestation 4 days prior in [place]. States that since then she had not been feeling well. She admits to pain in the right lower portion of her abdomen around the right adnexal region primarily with radiation of the right lateral abdomen. Admits to some clear vaginal discharge but denies any vaginal purulent drainage or abnormal bleeding. Denies any dysuria or change in urinary frequency. Admits to fevers as high as nearly 103 °F.  Admits to chills. Admits to malaise and fatigue. Admits to anorexia. Headache, neck pain or stiffness or any acute neurological complaints. No chest pain, shortness of breath, cough or upper respiratory complaints. Denies hemoptysis. Abdominal complaints as described without flank pain reported although the right lateral abdominal discomfort does approach to flank. PAST MEDICAL Hx:  Past Medical History:   Diagnosis Date    Abnormal Pap smear     False labor before 37 completed weeks of gestation in third trimester 2019    Intrauterine growth restriction affecting care of mother, antepartum, third trimester, not applicable or unspecified fetus 2019    Iron deficiency anemia 2015     (spontaneous vaginal delivery) 3/4/2019       PAST SURGICAL Hx:   No past surgical history on file. FAMILY Hx:  No family history on file.     HOME MEDICATIONS:  Prior to Admission medications    Medication Sig Start Date End Date Taking? Authorizing Provider   ibuprofen (ADVIL;MOTRIN) 800 MG tablet Take 1 tablet by mouth every 6 hours as needed for Pain 3/6/19  Yes Musa Condon MD   Prenatal Vit-Fe Fumarate-FA (PRENATAL 1+1 PO) Take 1 tablet by mouth    Historical Provider, MD       ALLERGIES:  Patient has no known allergies. SOCIAL Hx:  Social History     Socioeconomic History    Marital status: Single     Spouse name: Not on file    Number of children: Not on file    Years of education: Not on file    Highest education level: Not on file   Occupational History    Not on file   Social Needs    Financial resource strain: Not on file    Food insecurity     Worry: Not on file     Inability: Not on file    Transportation needs     Medical: Not on file     Non-medical: Not on file   Tobacco Use    Smoking status: Light Tobacco Smoker    Smokeless tobacco: Never Used   Substance and Sexual Activity    Alcohol use: No    Drug use: No    Sexual activity: Yes     Partners: Male   Lifestyle    Physical activity     Days per week: Not on file     Minutes per session: Not on file    Stress: Not on file   Relationships    Social connections     Talks on phone: Not on file     Gets together: Not on file     Attends [employer] service: Not on file     Active member of club or organization: Not on file     Attends meetings of clubs or organizations: Not on file     Relationship status: Not on file    Intimate partner violence     Fear of current or ex partner: Not on file     Emotionally abused: Not on file     Physically abused: Not on file     Forced sexual activity: Not on file   Other Topics Concern    Not on file   Social History Narrative    Not on file       ROS:12 point review of system was conducted and reviewed in the HPI, pertinent positives and negatives were discussed and, aside that mentioned above all 12 systems were reviewed and negative.       PHYSICAL EXAM:  VITALS:  Vitals:    08/03/20 0745   BP: 97/64   Pulse: 110   Resp: 16   Temp: 98.2 °F (36.8 °C)   SpO2: 98%         CONSTITUTIONAL:    Awake, alert, cooperative, ill-appearing without apparent distress, and appears stated age    EYES:    PERRL, EOMI, sclera clear without icterus, conjunctiva normal    ENT:    Normocephalic, atraumatic, sinuses nontender on palpation. External ears without lesions. Oral pharynx with moist mucus membranes. Tonsils without erythema or exudates. NECK:    Supple, symmetrical, trachea midline, no adenopathy, thyroid symmetric, not enlarged and no tenderness, skin normal, no bruits, no JVD    HEMATOLOGIC/LYMPHATICS:    No cervical lymphadenopathy and no supraclavicular lymphadenopathy    LUNGS:    Symmetric. No increased work of breathing, diminished air exchange, clear to auscultation bilaterally, no wheezes, rhonchi, or rales,     CARDIOVASCULAR:    Normal apical impulse, regular rate and rhythm, normal S1 and S2, no S3 or S4, and 3-7/3 systolic murmur noted    ABDOMEN:    The right lower quadrant and adnexal region are tender to palpation with voluntary guarding elicited but without rigidity. McBurney's point is not a focal epicenter of tenderness and psoas and obturator findings are not noted. Rovsing sign is negative. Flank area is percussed with only mild tenderness elicited but without true CVA withdrawal.  Normal contour, normal bowel sounds, soft, non-distended, no masses palpated, no hepatosplenomegaly, no rebound elicited on palpation     MUSCULOSKELETAL:    The left wrist is tender over the scaphoid and anatomical snuffbox however the distal neurovascular examination is intact. With the thumb flexed and fist completed over top the wrist is able to be moved both medially and laterally without laxity or significant pain. There is no crepitation on active or passive range of motion. Circulatory examination is unremarkable.   There is no redness, warmth, or swelling of the joints. Full range of motion noted. Motor strength is 5 out of 5 all extremities bilaterally. Tone is normal.    NEUROLOGIC:    Awake, alert, oriented to name, place and time. Cranial nerves II-XII are grossly intact. Motor is 5 out of 5 bilaterally. SKIN:    No bruising or bleeding. No redness, warmth, or swelling    EXTREMITIES:    Peripheral pulses present. No edema, cyanosis, or swelling.     LABORATORY DATA:  CBC with Differential:    Lab Results   Component Value Date    WBC 3.5 2020    RBC 2.78 2020    HGB 8.3 2020    HCT 24.5 2020    PLT 54 2020    MCV 88.1 2020    MCH 29.9 2020    MCHC 33.9 2020    RDW 15.1 2020    SEGSPCT 76 2012    METASPCT 0.9 2020    LYMPHOPCT 8.8 2020    MONOPCT 2.6 2020    BASOPCT 0.9 2020    MONOSABS 0.10 2020    LYMPHSABS 0.35 2020    EOSABS 0.09 2020    BASOSABS 0.00 2020     BMP:    Lab Results   Component Value Date     2020    K 3.5 2020    K 2.9 2020     2020    CO2 19 2020    BUN 13 2020    LABALBU 2.3 2020    CREATININE 0.7 2020    CALCIUM 7.3 2020    GFRAA >60 2020    LABGLOM >60 2020    GLUCOSE 102 2020       ASSESSMENT:  · Sepsis secondary to Streptococcus pyogenes bacteremia in the setting of recent elective   · Atraumatic left wrist pain with concern for septic arthritis versus occult scaphoid fracture  · Acute on chronic anemia in the postoperative setting with pancytopenia  · Mild hyponatremia in the setting of moderate dehydration  · Moderate protein calorie malnutrition  · Hyperbilirubinemia, leukopenia and thrombocytopenia with recent early second trimester elective  and HELLP evaluation underway    PLAN:  Lexy Dickinson was admitted from the emergency department after she met criteria for sepsis with the source of infection not immediately identified. In the setting of recent elective 15-week gestational age , there is concern for procedural complication. CT of the abdomen pelvis will be performed as she is complaining of pain in the right lower portion of her abdomen and she is tender in this area on my examination. Ultrasound did not reveal any retained products of conception. Infectious evaluation is underway and preliminary blood culture report showed gram-positive organism which is now identified a Streptococcus pyogenes. The setting of gram-positive bacteremia, cardiology will be consulted for OSIRIS to rule out vegetations/endocarditis. ID is following for assistance in management of bacteremia and antibiotics are being administered at their discretion. There is significant pain and reduced range of motion to the left wrist and with bacteremia present, there is concern for seeding of the joint with septic arthritis. CT scan will be performed for further investigation. As there is point tenderness over the scaphoid and anatomical snuffbox, occult fracture cannot be excluded. Neurovascularly she is intact however. Anemia is noted and HELLP evaluation is underway in the setting of recent pregnancy. Anemia work-up will be undertaken. Type and screen will be obtained as she may require transfusion if indicated. She is pancytopenic as well with leukopenia and thrombocytopenia, Peripheral smear is pending pathology review, Haptoglobin is elevated. COVID is negative.  See additional orders for details     POOJA Kent  11:54 AM  8/3/2020    Electronically signed by SUZANNE Kent CNP on 8/3/20 at 11:54 AM EDT

## 2020-08-03 NOTE — PROGRESS NOTES
Pharmacy Consultation Note  (Antibiotic Dosing and Monitoring)    Initial consult date: 2020  Consulting physician: Black  Drug: Vancomycin  Indication: Sepsis    Age/  Gender Height Weight IBW Dosing weight  Allergy Information   24 y.o./female 5' 1\" (154.9 cm) 110 lb (49.9 kg)     Ideal body weight: 47.8 kg (105 lb 6.1 oz)  Adjusted ideal body weight: 48.6 kg (107 lb 3.7 oz)  49.9  Patient has no known allergies. Temp (24hrs), Av.1 °F (37.8 °C), Min:97.9 °F (36.6 °C), Max:102.9 °F (39.4 °C)          Date  WBC BUN SCr CrCl  (mL/min) Drug/Dose Time   Given Level(s)   (Time) Comments   2020 5.4 9 0.8  81   Vancomycin 750 mg IV  (0)                                          No intake or output data in the 24 hours ending 20    Historical Cultures:  No results found for: ORG  No results for input(s): BC in the last 72 hours. Cultures:  available culture and sensitivity results were reviewed in [facility]    Assessment:  · 21 y.o. female has been initiated Vancomycin.   · Estimated CrCl = 81 mL/min  · Goal trough level = 15-20 mcg/mL    Plan:  · Will initiate vancomycin at a dose of 750 mg every 12 hours  · Monitor renal function   · Clinical pharmacy to follow      Sly Chaidez [facility] 2020 9:03 PM

## 2020-08-03 NOTE — CONSULTS
Gyn Consult Note      Reason for Consult: Sepsis, status post   Requesting Physician: Dr. Judi Garcia:   Generalized pains    History obtained from patient    HISTORY OF PRESENT ILLNESS:     The patient is a 21 y.o. female  3 para 2 who admitted to have an  at around 15 weeks in [place] approximately 5 days ago. Patient was admitted through the emergency room yesterday after her work-up was suggestive of septic . Ultrasound was performed and no products of conception were seen inside the uterus. Infectious diseases were consulted and IV antibiotics were started. Past Medical History:        Diagnosis Date    Abnormal Pap smear     False labor before 37 completed weeks of gestation in third trimester 2019    Intrauterine growth restriction affecting care of mother, antepartum, third trimester, not applicable or unspecified fetus 2019    Iron deficiency anemia 2015     (spontaneous vaginal delivery) 3/4/2019     Past Surgical History:    No past surgical history on file. Past Gynecological History:    1. Last menstrual period: 16 weeks ago  2. Menses: interval:  4 weeks  duration:  5 day(s)  3. Contraception: None  4. Sexually transmitted disease history: none        A.  Number of sexual partners in the last 6 months: 1    meds:  Current Facility-Administered Medications:     0.9% NaCl with KCl 40 mEq infusion, , Intravenous, Continuous, Jinnie Median, DO, Last Rate: 100 mL/hr at 20 1148    albuterol (PROVENTIL) nebulizer solution 2.5 mg, 2.5 mg, Nebulization, Q6H PRN, Jinnie Median, DO    ondansetron [facility]) injection 4 mg, 4 mg, Intravenous, Q6H PRN, Jinnie Median, DO    acetaminophen (TYLENOL) tablet 650 mg, 650 mg, Oral, Q4H PRN, Jinnie Median, DO, 650 mg at 20 1606    ketorolac (TORADOL) injection 30 mg, 30 mg, Intravenous, Q6H PRN, Jinnie Median, DO, 30 mg at 20 0602    piperacillin-tazobactam (ZOSYN) 3.375 g in dextrose 5 % 50 mL IVPB extended infusion (mini-bag), 3.375 g, Intravenous, Q8H, Last Rate: 12.5 mL/hr at 08/03/20 1606, 3.375 g at 08/03/20 1606 **AND** 0.9 % sodium chloride infusion, , Intravenous, Q8H, Fariba Lott DO, Stopped at 08/03/20 1352    vancomycin (VANCOCIN) 750 mg in dextrose 5 % 250 mL IVPB, 750 mg, Intravenous, Q12H, Fariba Lott DO, Stopped at 08/03/20 1110       Allergies:  Patient has no known allergies. Social History:  TOBACCO:   reports that she has been smoking. She has never used smokeless tobacco.  ETOH:   reports no history of alcohol use. DRUGS:   reports no history of drug use. Family History:   No family history on file.     Review of Systems  Review of Systems -  General ROS: negative for - chills, fatigue or malaise  ENT ROS: negative for - hearing change, nasal congestion or nasal discharge  Allergy and Immunology ROS: negative for - hives, itchy/watery eyes or nasal congestion  Hematological and Lymphatic ROS: negative for - blood clots, blood transfusions, bruising or fatigue  Endocrine ROS: negative for - malaise/lethargy, mood swings, palpitations or polydipsia/polyuria  Breast ROS: negative for - new or changing breast lumps or nipple changes  Respiratory ROS: negative for - sputum changes, stridor, tachypnea or wheezing  Cardiovascular ROS: negative for - irregular heartbeat, loss of consciousness, murmur or orthopnea  Gastrointestinal ROS: negative for - constipation, diarrhea, gas/bloating, heartburn or hematemesis  Genito-Urinary ROS: negative for -  genital discharge, genital ulcers or hematuria  Musculoskeletal ROS: negative for - gait disturbance, muscle pain or muscular weakness        PHYSICAL EXAM:    Vitals:  BP (!) 98/56   Pulse 105   Temp 99 °F (37.2 °C) (Oral)   Resp 16   Ht 5' 1\" (1.549 m)   Wt 110 lb (49.9 kg)   SpO2 98%   BMI 20.78 kg/m²     General appearance:  NAD  Pyscho/social: neg for tremors and hallucinations  Head: NCAT, PERRLA, EOMI, red conjunctiva  Neck: supple, no masses  Lungs: CTAB, equal chest rise bilateral  Heart: Reg rate  Abdomen: soft, moderately tender in RUQ, nondistended  Skin; no lesions  Gu: no cva tenderness  Extremities: extremities normal, atraumatic, no cyanosis or edema    GYN PELVIC EXAM: Deferred    DATA:  Labs:    CBC:   Lab Results   Component Value Date    WBC 3.5 2020    RBC 2.78 2020    HGB 9.0 2020    HCT 26.5 2020    MCV 88.1 2020    RDW 15.1 2020    PLT 54 2020     US NON OB TRANSVAGINAL [[id number]]  Collected: 20        Order Status: Completed  Updated: 20       Narrative:         EXAMINATION:   PELVIC ULTRASOUND     2020     TECHNIQUE:   Transvaginal pelvic ultrasound was performed. COMPARISON:   None     HISTORY:   ORDERING SYSTEM PROVIDED HISTORY: sepsis, eval for retained products   following elective  TECHNOLOGIST PROVIDED HISTORY: Reason for   exam:->sepsis, eval for retained products following elective      FINDINGS:     Measurements:     Uterus: The uterus was enlarged measuring 11.7 x 7.4 x 8.0 cm     Endometrial stripe: Normal in thickness measuring 8.8 mm. Right Ovary: Normal in size measuring 4.2 x 2.4 x 2.2 cm with normal   appearing follicles. Left Ovary: Normal in size measuring 3.2 x 2.7 x 2.5 cm with normal appearing   follicles. Ultrasound Findings:     Uterus: Uterus demonstrates normal myometrial echotexture. I do not see any   retained products of conception. Endometrial stripe: Normal in thickness measuring 8.8 mm. Right Ovary: No right ovarian mass or cyst was noted. There is normal   arterial and venous doppler flow. Left Ovary: No left ovarian mass or cyst was identified. Normal arterial and   venous blood flow was noted. Free Fluid: No free fluid was identified in the cul-de-sac.       Impression:         I do not see any retained products of conception.      No endometrial thickening. It measured 8.8 mm. No ovarian mass or cyst.     No free fluid in the cul-de-sac. IMPRESSION/RECOMMENDATIONS:      Status post  at 15 weeks  Findings are mostly consistent with septic       Plan:  As the ultrasound showing an empty uterus with a normal endometrial thickening, there is no need for any D&Cs at this point. I will continue with the current antibiotics as ordered. Thank you for involving me in the care of this patient.   I will follow  Daily CBCs if not ordered already    Rico Kathleen  8/3/2020 6:15 PM

## 2020-08-03 NOTE — PLAN OF CARE
Problem: Pain:  Goal: Control of acute pain  Description: Control of acute pain  8/3/2020 1110 by Shannan Gee RN  Outcome: Met This Shift

## 2020-08-03 NOTE — CARE COORDINATION
8-3-Cm note: per chart review, no apparent needs for transition of care, however, if pt needs IV Abx's at dc she will require a PICC line and choiced for home care vs. Infusion center.  Electronically signed by Chaya Disla RN on 8/3/2020 at 12:55 PM

## 2020-08-04 ENCOUNTER — ANESTHESIA (OUTPATIENT)
Dept: ENDOSCOPY | Age: 25
DRG: 564 | End: 2020-08-04
Payer: COMMERCIAL

## 2020-08-04 VITALS
RESPIRATION RATE: 24 BRPM | SYSTOLIC BLOOD PRESSURE: 130 MMHG | OXYGEN SATURATION: 98 % | DIASTOLIC BLOOD PRESSURE: 89 MMHG

## 2020-08-04 LAB
BASOPHILS ABSOLUTE: 0 E9/L (ref 0–0.2)
BASOPHILS RELATIVE PERCENT: 0.3 % (ref 0–2)
BURR CELLS: ABNORMAL
C-REACTIVE PROTEIN: 27.9 MG/DL (ref 0–0.4)
EOSINOPHILS ABSOLUTE: 0.23 E9/L (ref 0.05–0.5)
EOSINOPHILS RELATIVE PERCENT: 3.5 % (ref 0–6)
HCT VFR BLD CALC: 25.8 % (ref 34–48)
HEMOGLOBIN: 8.8 G/DL (ref 11.5–15.5)
LYMPHOCYTES ABSOLUTE: 0.72 E9/L (ref 1.5–4)
LYMPHOCYTES RELATIVE PERCENT: 10.5 % (ref 20–42)
MCH RBC QN AUTO: 30 PG (ref 26–35)
MCHC RBC AUTO-ENTMCNC: 34.1 % (ref 32–34.5)
MCV RBC AUTO: 88.1 FL (ref 80–99.9)
MONOCYTES ABSOLUTE: 0.06 E9/L (ref 0.1–0.95)
MONOCYTES RELATIVE PERCENT: 0.9 % (ref 2–12)
NEUTROPHILS ABSOLUTE: 5.53 E9/L (ref 1.8–7.3)
NEUTROPHILS RELATIVE PERCENT: 85.1 % (ref 43–80)
PDW BLD-RTO: 15.6 FL (ref 11.5–15)
PLATELET # BLD: 69 E9/L (ref 130–450)
PLATELET CONFIRMATION: NORMAL
PMV BLD AUTO: ABNORMAL FL (ref 7–12)
POIKILOCYTES: ABNORMAL
POLYCHROMASIA: ABNORMAL
RBC # BLD: 2.93 E12/L (ref 3.5–5.5)
SEDIMENTATION RATE, ERYTHROCYTE: 45 MM/HR (ref 0–20)
URIC ACID, SERUM: 2.1 MG/DL (ref 2.4–5.7)
VANCOMYCIN TROUGH: 5.5 MCG/ML (ref 5–16)
WBC # BLD: 6.5 E9/L (ref 4.5–11.5)

## 2020-08-04 PROCEDURE — 3700000000 HC ANESTHESIA ATTENDED CARE: Performed by: INTERNAL MEDICINE

## 2020-08-04 PROCEDURE — 6360000002 HC RX W HCPCS: Performed by: INTERNAL MEDICINE

## 2020-08-04 PROCEDURE — 6370000000 HC RX 637 (ALT 250 FOR IP): Performed by: SPECIALIST

## 2020-08-04 PROCEDURE — 93312 ECHO TRANSESOPHAGEAL: CPT

## 2020-08-04 PROCEDURE — 3700000001 HC ADD 15 MINUTES (ANESTHESIA): Performed by: INTERNAL MEDICINE

## 2020-08-04 PROCEDURE — 2580000003 HC RX 258: Performed by: INTERNAL MEDICINE

## 2020-08-04 PROCEDURE — 85025 COMPLETE CBC W/AUTO DIFF WBC: CPT

## 2020-08-04 PROCEDURE — 85651 RBC SED RATE NONAUTOMATED: CPT

## 2020-08-04 PROCEDURE — 7100000011 HC PHASE II RECOVERY - ADDTL 15 MIN: Performed by: INTERNAL MEDICINE

## 2020-08-04 PROCEDURE — 6360000002 HC RX W HCPCS: Performed by: NURSE ANESTHETIST, CERTIFIED REGISTERED

## 2020-08-04 PROCEDURE — 86140 C-REACTIVE PROTEIN: CPT

## 2020-08-04 PROCEDURE — 2060000000 HC ICU INTERMEDIATE R&B

## 2020-08-04 PROCEDURE — 93325 DOPPLER ECHO COLOR FLOW MAPG: CPT

## 2020-08-04 PROCEDURE — 36415 COLL VENOUS BLD VENIPUNCTURE: CPT

## 2020-08-04 PROCEDURE — 80202 ASSAY OF VANCOMYCIN: CPT

## 2020-08-04 PROCEDURE — 6370000000 HC RX 637 (ALT 250 FOR IP): Performed by: INTERNAL MEDICINE

## 2020-08-04 PROCEDURE — 93312 ECHO TRANSESOPHAGEAL: CPT | Performed by: INTERNAL MEDICINE

## 2020-08-04 PROCEDURE — 7100000010 HC PHASE II RECOVERY - FIRST 15 MIN: Performed by: INTERNAL MEDICINE

## 2020-08-04 PROCEDURE — 87040 BLOOD CULTURE FOR BACTERIA: CPT

## 2020-08-04 PROCEDURE — 84550 ASSAY OF BLOOD/URIC ACID: CPT

## 2020-08-04 PROCEDURE — 2709999900 HC NON-CHARGEABLE SUPPLY: Performed by: INTERNAL MEDICINE

## 2020-08-04 RX ORDER — PROPOFOL 10 MG/ML
INJECTION, EMULSION INTRAVENOUS PRN
Status: DISCONTINUED | OUTPATIENT
Start: 2020-08-04 | End: 2020-08-04 | Stop reason: SDUPTHER

## 2020-08-04 RX ORDER — PROPOFOL 10 MG/ML
INJECTION, EMULSION INTRAVENOUS CONTINUOUS PRN
Status: DISCONTINUED | OUTPATIENT
Start: 2020-08-04 | End: 2020-08-04 | Stop reason: SDUPTHER

## 2020-08-04 RX ORDER — CLOTRIMAZOLE 10 MG/1
10 LOZENGE ORAL; TOPICAL
Status: DISCONTINUED | OUTPATIENT
Start: 2020-08-04 | End: 2020-08-06 | Stop reason: HOSPADM

## 2020-08-04 RX ADMIN — PROPOFOL 30 MG: 10 INJECTION, EMULSION INTRAVENOUS at 13:43

## 2020-08-04 RX ADMIN — SODIUM CHLORIDE: 9 INJECTION, SOLUTION INTRAVENOUS at 04:00

## 2020-08-04 RX ADMIN — PROPOFOL 20 MG: 10 INJECTION, EMULSION INTRAVENOUS at 13:47

## 2020-08-04 RX ADMIN — PROPOFOL 30 MG: 10 INJECTION, EMULSION INTRAVENOUS at 13:44

## 2020-08-04 RX ADMIN — VANCOMYCIN HYDROCHLORIDE 750 MG: 5 INJECTION, POWDER, LYOPHILIZED, FOR SOLUTION INTRAVENOUS at 15:42

## 2020-08-04 RX ADMIN — VANCOMYCIN HYDROCHLORIDE 750 MG: 5 INJECTION, POWDER, LYOPHILIZED, FOR SOLUTION INTRAVENOUS at 08:13

## 2020-08-04 RX ADMIN — CLOTRIMAZOLE 10 MG: 10 LOZENGE ORAL; TOPICAL at 15:43

## 2020-08-04 RX ADMIN — SODIUM CHLORIDE: 9 INJECTION, SOLUTION INTRAVENOUS at 21:46

## 2020-08-04 RX ADMIN — PROPOFOL 20 MG: 10 INJECTION, EMULSION INTRAVENOUS at 13:46

## 2020-08-04 RX ADMIN — ACETAMINOPHEN 650 MG: 325 TABLET, FILM COATED ORAL at 21:29

## 2020-08-04 RX ADMIN — PIPERACILLIN AND TAZOBACTAM 3.38 G: 3; .375 INJECTION, POWDER, LYOPHILIZED, FOR SOLUTION INTRAVENOUS at 15:42

## 2020-08-04 RX ADMIN — PIPERACILLIN AND TAZOBACTAM 3.38 G: 3; .375 INJECTION, POWDER, LYOPHILIZED, FOR SOLUTION INTRAVENOUS at 08:00

## 2020-08-04 RX ADMIN — CLOTRIMAZOLE 10 MG: 10 LOZENGE ORAL; TOPICAL at 18:31

## 2020-08-04 RX ADMIN — PROPOFOL 20 MG: 10 INJECTION, EMULSION INTRAVENOUS at 13:45

## 2020-08-04 RX ADMIN — PROPOFOL 150 MCG/KG/MIN: 10 INJECTION, EMULSION INTRAVENOUS at 13:43

## 2020-08-04 ASSESSMENT — PAIN SCALES - GENERAL
PAINLEVEL_OUTOF10: 0
PAINLEVEL_OUTOF10: 7
PAINLEVEL_OUTOF10: 0
PAINLEVEL_OUTOF10: 7
PAINLEVEL_OUTOF10: 0

## 2020-08-04 ASSESSMENT — PAIN DESCRIPTION - ORIENTATION: ORIENTATION: LEFT

## 2020-08-04 ASSESSMENT — PAIN DESCRIPTION - DESCRIPTORS
DESCRIPTORS: ACHING;CONSTANT;DISCOMFORT
DESCRIPTORS: HEADACHE;ACHING

## 2020-08-04 ASSESSMENT — PAIN DESCRIPTION - LOCATION
LOCATION: HEAD
LOCATION: WRIST;HAND;SHOULDER

## 2020-08-04 ASSESSMENT — PAIN DESCRIPTION - PAIN TYPE
TYPE: ACUTE PAIN
TYPE: ACUTE PAIN

## 2020-08-04 NOTE — PROGRESS NOTES
Internal Medicine Progress Note    BI[facility]    Huong Rodriguez. Robbin Zavala., F.A.C.O.I. Ayana Alves D.O., DANIS.CHALINO.CRaffaeleORaffaeleIRaffaele Clark D.O. Wilmer Smith, MSN, APRN, NP-C  Savita Irving. Molly Smith, MSN, APRN-CNP     Primary Care Physician: Luis Zambrano MD   Admitting Physician:  Enoch Junior DO  Admission date and time: 8/2/2020  2:17 PM    Room:  [facility]/0624-01  Admitting diagnosis: Sepsis [facility]) [A41.9]    Patient Name: Tahir Guardado  MRN: [id number]    Date of Service: 8/4/2020     Subjective:  Yarely Anderson continues to complain of overall soreness. Plans are for transesophageal echocardiogram today in the setting of bacteremia. Multiple subspecialists have provide consultation and we discussed recommendations. I have encouraged the patient to become more ambulatory. She seems to be tolerating antibiotic support. Review of System:   Constitutional:   Admits to resolution of her presenting fever. She denies any chills. She admits to generalized malaise and fatigue. HEENT:   Denies ear pain, sore throat, sinus or eye problems. Cardiovascular:   Denies any chest pain, irregular heartbeats, or palpitations. Respiratory:   Denies shortness of breath, coughing, sputum production, hemoptysis, or wheezing. Gastrointestinal:   Abdominal contour compatible with recent pregnancy  Genitourinary:    Denies any urgency, frequency, hematuria. Voiding  without difficulty. Extremities:   Denies lower extremity swelling, edema or cyanosis. Neurology:    Denies any headache or focal neurological deficits, Denies generalized weakness or memory difficulty. Psch:   Denies being anxious or depressed. Musculoskeletal:    Admits to diffuse joint pain with myalgias and muscle aches. Integumentary:   Denies any rashes, ulcers, or excoriations. Denies bruising. Hematologic/Lymphatic:  Denies bruising or bleeding.     Physical Exam:  No intake/output data recorded. Intake/Output Summary (Last 24 hours) at 8/4/2020 1319  Last data filed at 8/4/2020 1047  Gross per 24 hour   Intake 3397.33 ml   Output --   Net 3397.33 ml   I/O last 3 completed shifts: In: 4317.3 [P.O.:340; I.V.:3327.3; IV Piggyback:650]  Out: -   Patient Vitals for the past 96 hrs (Last 3 readings):   Weight   08/02/20 2045 110 lb (49.9 kg)   08/02/20 1414 110 lb (49.9 kg)     Vital Signs:   Blood pressure (!) 195/66, pulse 102, temperature 98.8 °F (37.1 °C), temperature source Oral, resp. rate 18, height 5' 1\" (1.549 m), weight 110 lb (49.9 kg), SpO2 99 %, unknown if currently breastfeeding. General appearance:  Alert, responsive, oriented to person, place, and time. Well preserved, alert, no distress. Head:  Normocephalic. No masses, lesions or tenderness. Eyes:  PERRLA. EOMI. Sclera clear. Buccal mucosa moist.  ENT:  Ears normal. Mucosa normal.  Neck:    Supple. Trachea midline. No thyromegaly. No JVD. No bruits. Heart:    Rhythm regular. Rate controlled. No murmurs. Lungs:    Symmetrical. Clear to auscultation bilaterally. No wheezes. No rhonchi. No rales. Abdomen:   Abdominal contour compatible with recent pregnancy  Extremities:    Peripheral pulses present. No peripheral edema. No ulcers. No cyanosis. No clubbing. Neurologic:    Alert x 3. No focal deficit. Cranial nerves grossly intact. No focal weakness. Psych:   Behavior is normal. Mood appears normal. Speech is not rapid and/or pressured. Musculoskeletal:   Spine ROM normal. Muscular strength intact. Gait not assessed. Integumentary:  No rashes  Skin normal color and texture.   Genitalia/Breast:  Deferred    Medication:  Scheduled Meds:   vancomycin  750 mg Intravenous Q8H    clotrimazole  10 mg Oral 5x Daily    piperacillin-tazobactam  3.375 g Intravenous Q8H     Continuous Infusions:   0.9% NaCl with KCl 40 mEq 100 mL/hr at 08/03/20 1148    sodium chloride Stopped (08/04/20 9789)       Objective Data:  CBC with Differential:    Lab Results   Component Value Date    WBC 6.5 2020    RBC 2.93 2020    HGB 8.8 2020    HCT 25.8 2020    PLT 69 2020    MCV 88.1 2020    MCH 30.0 2020    MCHC 34.1 2020    RDW 15.6 2020    SEGSPCT 76 2012    METASPCT 0.9 2020    LYMPHOPCT 10.5 2020    MONOPCT 0.9 2020    BASOPCT 0.3 2020    MONOSABS 0.06 2020    LYMPHSABS 0.72 2020    EOSABS 0.23 2020    BASOSABS 0.00 2020     BMP:    Lab Results   Component Value Date     2020    K 3.5 2020    K 2.9 2020     2020    CO2 19 2020    BUN 13 2020    LABALBU 2.3 2020    CREATININE 0.7 2020    CALCIUM 7.3 2020    GFRAA >60 2020    LABGLOM >60 2020    GLUCOSE 102 2020       Assessment:  1. Sepsis secondary to beta-hemolytic Streptococcus in the setting of recent elective   2. Pancytopenia in the setting of sepsis  3. Moderate protein calorie malnutrition    Plan:   Multiple subspecialists are providing consultation including the infectious disease, OB/GYN, and cardiovascular teams. Broad-spectrum antibiotic therapy is being employed. Plans are for transesophageal echocardiogram today in the setting of bacteremia. I have encouraged the patient to become more active today. Continue current therapy. See orders for further plan of care. More than 50% of my  time was spent at the bedside counseling/coordinating care with the patient and/or family with face to face contact. This time was spent reviewing notes and laboratory data as well as instructing and counseling the patient. Time I spent with the family or surrogate(s) is included only if the patient was incapable of providing the necessary information or participating in medical decisions.  I also discussed the differential diagnosis and all of the proposed management plans with the patient and individuals accompanying the patient. Lauren requires this high level of physician care and nursing on the IMC/Telemetry unit due the complexity of decision management and chance of rapid decline or death. Continued cardiac monitoring and higher level of nursing are required. I am readily available for any further decision-making and intervention.      Roxana Mar DO, MICHA.C.O.I.  8/4/2020  1:19 PM

## 2020-08-04 NOTE — PROGRESS NOTES
Pharmacy Consultation Note  (Antibiotic Dosing and Monitoring)    Initial consult date: 8/2/2020  Consulting physician: Dr. Dayna Villa  Drug(s): Vancomycin  Indication: Bacteremia    Ht Readings from Last 1 Encounters:   08/02/20 5' 1\" (1.549 m)     Wt Readings from Last 1 Encounters:   08/02/20 110 lb (49.9 kg)     Age/  Gender IBW DW  Allergy Information   21 y.o.   female 47.8 kg 49.9 kg  Patient has no known allergies. Date  Tmax WBC BUN/CR UOP  (mL/kg/hr) Drug/Dose Time   Given Level(s)   (Time) Comments   8/2  (#1) 102.9 4.5 9/0.8 -- Vancomycin 750 mg IV q12hr 2223     8/3  (#2) afebrile 3.5 13/0.7 -- Vancomycin 750 mg IV q12hr 1012  2112     8/4  (#3) afebrile 6.5 -- -- Vancomycin 750 mg IV q12hr    Vancomycin 750 mg IV q8hr 0813    <1600> Trough @ 0819 = 5.5 mcg/mL      (#4)             (#5)             (#6)             (#7)             Estimated Creatinine Clearance: 93 mL/min (based on SCr of 0.7 mg/dL). UOP over the past 24 hours:       Intake/Output Summary (Last 24 hours) at 8/4/2020 1020  Last data filed at 8/4/2020 0618  Gross per 24 hour   Intake 4197.33 ml   Output --   Net 4197.33 ml       Other anti-infectives: Anti-infective Dose Date Initiated Date Stopped   Azithromycin  500 mg IV x 1 8/2 8/2   Ceftriaxone 2g IV x 1 8/2 8/2   Pip/tazo 3.375g IV q8hr 8/2      Cultures:  available culture and sensitivity results were reviewed in EPIC  Cultures sent and are pending. Culture Date Result    Strep/legionella urine antigen 8/2 Negative   Urine cx 8/2    Respiratory panel 8/2 Not detected   Blood cx 1 8/2 GPC in chains   Blood cx 2 8/2 GPC in chains   COVID-19 8/2 Not detected     Assessment:  · Consulted by Dr. Dayna Villa to dose/monitor vancomycin  · Goal trough level:  15-20 mcg/mL  · Pt is a 21 yoF who presented from home with fever and abdominal pain. Blood cultures positive for GPC in chains, Strep species by PCR.    · Serum creatinine yesterday: 0.7 mg/dL; CrCl ~ 93 mL/min; baseline Scr unknown  · 8/4: Trough = 5.5 mcg/mL    Plan:  · Increase to vancomycin 750 mg IV q8hr  · Trough at new steady state  · Follow renal function  · Pharmacist will follow and monitor/adjust dosing as necessary      Thank you for the consult,    Iesha Santana, PharmD, BCPS 8/4/2020 10:20 AM   Ext: [phone]

## 2020-08-04 NOTE — ANESTHESIA POSTPROCEDURE EVALUATION
Department of Anesthesiology  Postprocedure Note    Patient: Tosin Page  MRN: [id number]  YOB: 1995  Date of evaluation: 8/4/2020  Time:  2:21 PM     Procedure Summary     Date:  08/04/20 Room / Location:  [address] [place] [address] AT [facility] ([facility])    Anesthesia Start:  1002 Anesthesia Stop:  5765    Procedure:  TRANSESOPHAGEAL ECHOCARDIOGRAM (N/A ) Diagnosis:  (BACTEREMIA)    Surgeon:  Zafar Covarrubias MD Responsible Provider:  Leticia Farrell MD    Anesthesia Type:  MAC ASA Status:  2          Anesthesia Type: MAC    Veda Phase I:      Veda Phase II: Veda Score: 10    Last vitals: Reviewed and per EMR flowsheets.        Anesthesia Post Evaluation    Patient location during evaluation: PACU  Patient participation: complete - patient participated  Level of consciousness: awake and alert  Airway patency: patent  Complications: no  Cardiovascular status: hemodynamically stable  Respiratory status: acceptable  Hydration status: stable

## 2020-08-04 NOTE — ANESTHESIA PRE PROCEDURE
Department of Anesthesiology  Preprocedure Note       Name:  Elijah Gonzales   Age:  21 y.o.  :  1995                                          MRN:  [id number]         Date:  8/3/2020      Surgeon: John Burgos):  Eloina Savage MD    Procedure: Procedure(s):  TRANSESOPHAGEAL ECHOCARDIOGRAM    Medications prior to admission:   Prior to Admission medications    Medication Sig Start Date End Date Taking?  Authorizing Provider   ibuprofen (ADVIL;MOTRIN) 800 MG tablet Take 1 tablet by mouth every 6 hours as needed for Pain 3/6/19  Yes Amine R MD Hever   Prenatal Vit-Fe Fumarate-FA (PRENATAL 1+1 PO) Take 1 tablet by mouth    Historical Provider, MD       Current medications:    Current Facility-Administered Medications   Medication Dose Route Frequency Provider Last Rate Last Dose    0.9% NaCl with KCl 40 mEq infusion   Intravenous Continuous [place] Sand,  mL/hr at 20 1148      albuterol (PROVENTIL) nebulizer solution 2.5 mg  2.5 mg Nebulization Q6H PRN Autumn Sand, DO        [facility]) [facility] 4 mg  4 mg Intravenous Q6H PRN [place] Sand, DO        acetaminophen (TYLENOL) tablet 650 mg  650 mg Oral Q4H PRN [place] Sand, DO   650 mg at 20 1606    ketorolac (TORADOL) injection 30 mg  30 mg Intravenous Q6H PRN Autumn Sand, DO   30 mg at 20 0602    piperacillin-tazobactam (ZOSYN) 3.375 g in dextrose 5 % 50 mL IVPB extended infusion (mini-bag)  3.375 g Intravenous Q8H Autumn Sand, DO 12.5 mL/hr at 20 1606 3.375 g at 20 1606    And    0.9 % sodium chloride infusion   Intravenous Q8H [place] Sand, DO   Stopped at 20 1352    vancomycin (VANCOCIN) 750 mg in dextrose 5 % 250 mL IVPB  750 mg Intravenous Q12H [place] Sand, DO   Stopped at 20 1110       Allergies:  No Known Allergies    Problem List:    Patient Active Problem List   Diagnosis Code    Condyloma acuminatum A63.0    Iron deficiency anemia D50.9    Intrauterine growth restriction affecting care of mother, antepartum, third trimester, not applicable or unspecified fetus O45.26    44 weeks gestation of pregnancy Z3A.39     (spontaneous vaginal delivery) O80    FTND (full term normal delivery) O80    Sepsis ([facility] A41.9       Past Medical History:        Diagnosis Date    Abnormal Pap smear     False labor before 37 completed weeks of gestation in third trimester 2019    Intrauterine growth restriction affecting care of mother, antepartum, third trimester, not applicable or unspecified fetus 2019    Iron deficiency anemia 2015     (spontaneous vaginal delivery) 3/4/2019       Past Surgical History:  History reviewed. No pertinent surgical history. Social History:    Social History     Tobacco Use    Smoking status: Light Tobacco Smoker    Smokeless tobacco: Never Used   Substance Use Topics    Alcohol use: No                                Ready to quit: Not Answered  Counseling given: Not Answered      Vital Signs (Current):   Vitals:    20 2045 20 0330 20 0745 20 1545   BP: 103/63 (!) 90/55 97/64 (!) 98/56   Pulse: 111 107 110 105   Resp: 20 16 16 16   Temp: 97.9 °F (36.6 °C) 98.4 °F (36.9 °C) 98.2 °F (36.8 °C) 99 °F (37.2 °C)   TempSrc: Infrared Oral Oral Oral   SpO2:   98% 98%   Weight: 110 lb (49.9 kg)      Height: 5' 1\" (1.549 m)                                                 BP Readings from Last 3 Encounters:   20 (!) 98/56   19 104/62   19 118/63       NPO Status:                                                                                 BMI:   Wt Readings from Last 3 Encounters:   20 110 lb (49.9 kg)   19 112 lb (50.8 kg)   05/15/18 98 lb (44.5 kg)     Body mass index is 20.78 kg/m².     CBC:   Lab Results   Component Value Date    WBC 3.5 2020    RBC 2.78 2020    HGB 9.0 2020    HCT 26.5 2020    MCV 88.1 2020    RDW 15.1 2020    PLT 54 2020       CMP:   Lab Results   Component Value Date     08/03/2020    K 3.5 08/03/2020    K 2.9 08/02/2020     08/03/2020    CO2 19 08/03/2020    BUN 13 08/03/2020    CREATININE 0.7 08/03/2020    GFRAA >60 08/03/2020    LABGLOM >60 08/03/2020    GLUCOSE 102 08/03/2020    PROT 5.2 08/03/2020    CALCIUM 7.3 08/03/2020    BILITOT 1.5 08/03/2020    ALKPHOS 63 08/03/2020    AST 20 08/03/2020    ALT 12 08/03/2020       POC Tests: No results for input(s): POCGLU, POCNA, POCK, POCCL, POCBUN, POCHEMO, POCHCT in the last 72 hours. Coags:   Lab Results   Component Value Date    PROTIME 14.6 08/02/2020    INR 1.3 08/02/2020    APTT 29.1 08/02/2020       HCG (If Applicable):   Lab Results   Component Value Date    PREGTESTUR neg 03/07/2014        ABGs: No results found for: PHART, PO2ART, XPS4KFD, PET7PXD, BEART, W5PZASTD     Type & Screen (If Applicable):  No results found for: LABABO, LABRH    Drug/Infectious Status (If Applicable):  No results found for: HIV, HEPCAB    COVID-19 Screening (If Applicable):   Lab Results   Component Value Date    COVID19 Not Detected 08/02/2020         Anesthesia Evaluation  Patient summary reviewed  Airway: Mallampati: II  TM distance: >3 FB   Neck ROM: full  Mouth opening: > = 3 FB Dental: normal exam         Pulmonary: breath sounds clear to auscultation  (+) current smoker (Light Tobacco Smoker.)                           Cardiovascular:Negative CV ROS            Rhythm: regular  Rate: normal                    Neuro/Psych:   Negative Neuro/Psych ROS              GI/Hepatic/Renal: Neg GI/Hepatic/Renal ROS            Endo/Other:    (+) blood dyscrasia (Iron Deficiency Anemia.): anemia:., .                  ROS comment: Spontaneous Vaginal Deliveries. Abnormal PAP Smear. False Labor before 37 weeks. Intra uterine growth restriction. Abdominal:         (-) obese     Vascular: negative vascular ROS.                                      Anesthesia Plan      MAC     ASA 2       Induction: intravenous. Anesthetic plan and risks discussed with patient. Plan discussed with CRNA. Jose Guzman MD   8/3/2020    DOS STAFF ADDENDUM:    Pt seen and examined, chart reviewed (including anesthesia, drug and allergy history). Anesthetic plan, risks, benefits, alternatives, and personnel involved discussed with patient. Patient verbalized an understanding and agrees to proceed. Plan discussed with care team members and agreed upon.     Sandee Stacy MD  Staff Anesthesiologist  12:54 PM

## 2020-08-04 NOTE — PROGRESS NOTES
[address] [facility]  [facility]  Progress Note    NAME:Lauren Guadalupe  1995  DATE OF SERVICE:20    Pt was seen FACE TO FACE FOR Beta Hemolytic Streptococcus SEPTICEMIA    SUBJECTIVE:  Chief Complaint   Patient presents with    Generalized Body Aches    Fever     102.9 oral triage no tylenol today    C/O SORE THROAT FOR OSIRIS  Patient is tolerating medications. No reported adverse drug reactions. Review of systems:  As stated above in the chief complaint, otherwise negative. Medications:  Scheduled Meds:   vancomycin  750 mg Intravenous Q8H    clotrimazole  10 mg Oral 5x Daily    piperacillin-tazobactam  3.375 g Intravenous Q8H     Continuous Infusions:   0.9% NaCl with KCl 40 mEq 100 mL/hr at 20 1148    sodium chloride Stopped (20 06)     PRN Meds:albuterol, ondansetron, acetaminophen, ketorolac    OBJECTIVE:  BP (!) 195/66   Pulse 102   Temp 98.8 °F (37.1 °C) (Oral)   Resp 18   Ht 5' 1\" (1.549 m)   Wt 110 lb (49.9 kg)   SpO2 99%   BMI 20.78 kg/m²   Temp  Av.8 °F (37.1 °C)  Min: 98.6 °F (37 °C)  Max: 99 °F (37.2 °C)  Constitutional:  The patient is awake, alert, and oriented. Skin:    Warm and dry. No rashes were noted. HEENT:   Round and reactive pupils. AT/NC ? THRUSH  Neck:    Supple to movements. Chest:   No use of accessory muscles to breathe. Symmetrical expansion. Cardiovascular:  S1 and S2 are rhythmic and regular. No murmurs appreciated. Abdomen:   Positive bowel sounds to auscultation. Benign to palpation. Extremities:   No clubbing, no cyanosis, no edema.   CNS    AAxO   Lines: pIV    Radiology:  Laboratory and Tests Review:  Lab Results   Component Value Date    WBC 6.5 2020    WBC 3.5 (L) 2020    WBC 4.5 2020    HGB 8.8 (L) 2020    HCT 25.8 (L) 2020    MCV 88.1 2020    PLT 69 (L) 2020     No results found for: CRP  Lab Results   Component Value Date    ALT 12 2020 AST 20 08/03/2020    ALKPHOS 63 08/03/2020    BILITOT 1.5 (H) 08/03/2020     Lab Results   Component Value Date     08/03/2020    K 3.5 08/03/2020    K 2.9 08/02/2020     08/03/2020    CO2 19 08/03/2020    BUN 13 08/03/2020    CREATININE 0.7 08/03/2020    CREATININE 0.8 08/02/2020    CREATININE 0.6 01/08/2015    GFRAA >60 08/03/2020    LABGLOM >60 08/03/2020    GLUCOSE 102 08/03/2020    PROT 5.2 08/03/2020    LABALBU 2.3 08/03/2020    CALCIUM 7.3 08/03/2020    BILITOT 1.5 08/03/2020    ALKPHOS 63 08/03/2020    AST 20 08/03/2020    ALT 12 08/03/2020     Lab Results   Component Value Date    CRP 27.9 (H) 08/04/2020     Lab Results   Component Value Date    SEDRATE 45 (H) 08/04/2020       Microbiology:   Recent Labs     08/02/20  1453   COVID19 Not Detected     Lab Results   Component Value Date    BLOODCULT2  08/02/2020     Gram stain performed from blood culture bottle media  Gram positive cocci in chains      BLOODCULT2  08/02/2020     Refer to previous culture for susceptibility results  of CXBL collected 08/02/2020 at 14:53      ORG Beta Hemolytic Streptococcus 08/02/2020    ORG Beta Hemolytic Streptococcus 08/02/2020      Recent Labs     08/02/20  1617   ORG Beta Hemolytic Streptococcus*     Recent Labs     08/02/20  1453 08/02/20  1617   ORG Beta Hemolytic Streptococcus* Beta Hemolytic Streptococcus*     Recent Labs     08/02/20  1453 08/02/20  1617 08/02/20  2130   LABURIN  --   --  <10,000 CFU/mL  Mixed gram positive organisms     ORG Beta Hemolytic Streptococcus* Beta Hemolytic Streptococcus*  --          ASSESSMENT/PLAN:  SEPSIS  PANCYTOPENIA   Beta Hemolytic Streptococcus SEPSIS FOR OSIRIS  ? THRUSH    Orders Placed This Encounter   Medications     piperacillin-tazobactam (ZOSYN) 3.375 g in dextrose 5 % 50 mL IVPB extended infusion (mini-bag)    vancomycin (VANCOCIN) 750 mg in dextrose 5 % 250 mL IVPB      CAN PROB NARROW ATBX IN AM   · Monitor labs    Imaging and labs were reviewed per medical records. The patient was educated about the diagnosis, prognosis, indications, risks and benefits of treatment. An opportunity to ask questions was given to the patient/FAMILY. Thank you for involving me in the care of Jhoan Miller I will continue to follow. Please do not hesitate to call for any questions or concerns.     Electronically signed by Romulo Paulino MD on 8/4/2020 at 1:31 PM

## 2020-08-04 NOTE — PROGRESS NOTES
OSIRIS - post-procedure Note;    Pre-operative Diagnosis: Bacteremia, R/O Endocarditis    Post-operative Diagnosis: No OSIRIS indication of endocarditis, mild MR and mild TR    Procedure: OSIRIS with agitated saline study.     Anesthesia: LMAC    Surgeons/Assistants: Dr Mayra Monk    Estimated Blood Loss: None    Complications: None    Full report to follow    Electronically signed by Lyman Goodell, MD, [facility] [place]

## 2020-08-04 NOTE — PROGRESS NOTES
Patient seen, examined; labs and rhythm strips reviewed. H&P reviewed    No dysphagia    Vitals:    08/04/20 1300   BP: 195/66   Pulse: 102   Resp: 18   Temp: 98.8 °F (37.1 °C)   SpO2:            Exam:    Pharynx - clear  Heart : Regular rhythm  Lungs: Clear      Diagnosis: Bacteremia - ID requested OSIRIS  Plan: OSIRIS to r/o Endocarditis - risk benefits discussed-She is agreeable fro OSIRIS.     Zafar Covarrubias MD  8/4/2020  1:47 PM

## 2020-08-04 NOTE — PLAN OF CARE
Problem: Pain:  Goal: Pain level will decrease  Description: Pain level will decrease  Outcome: Met This Shift  Goal: Control of acute pain  Description: Control of acute pain  Outcome: Met This Shift  Goal: Control of chronic pain  Description: Control of chronic pain  Outcome: Met This Shift   Patient states slight decrease in pain.

## 2020-08-05 ENCOUNTER — TELEPHONE (OUTPATIENT)
Dept: ADMINISTRATIVE | Age: 25
End: 2020-08-05

## 2020-08-05 ENCOUNTER — APPOINTMENT (OUTPATIENT)
Dept: GENERAL RADIOLOGY | Age: 25
DRG: 564 | End: 2020-08-05
Payer: COMMERCIAL

## 2020-08-05 LAB
BASOPHILS ABSOLUTE: 0 E9/L (ref 0–0.2)
BASOPHILS RELATIVE PERCENT: 0 % (ref 0–2)
BURR CELLS: ABNORMAL
EOSINOPHILS ABSOLUTE: 0.77 E9/L (ref 0.05–0.5)
EOSINOPHILS RELATIVE PERCENT: 9 % (ref 0–6)
HCT VFR BLD CALC: 23.7 % (ref 34–48)
HEMOGLOBIN: 8.1 G/DL (ref 11.5–15.5)
LYMPHOCYTES ABSOLUTE: 1.38 E9/L (ref 1.5–4)
LYMPHOCYTES RELATIVE PERCENT: 16 % (ref 20–42)
MCH RBC QN AUTO: 30.2 PG (ref 26–35)
MCHC RBC AUTO-ENTMCNC: 34.2 % (ref 32–34.5)
MCV RBC AUTO: 88.4 FL (ref 80–99.9)
MONOCYTES ABSOLUTE: 0.43 E9/L (ref 0.1–0.95)
MONOCYTES RELATIVE PERCENT: 5 % (ref 2–12)
MYELOCYTE PERCENT: 1 % (ref 0–0)
NEUTROPHILS ABSOLUTE: 6.02 E9/L (ref 1.8–7.3)
NEUTROPHILS RELATIVE PERCENT: 69 % (ref 43–80)
ORGANISM: ABNORMAL
OVALOCYTES: ABNORMAL
PDW BLD-RTO: 15.5 FL (ref 11.5–15)
PLATELET # BLD: 95 E9/L (ref 130–450)
PLATELET CONFIRMATION: NORMAL
PMV BLD AUTO: 13.3 FL (ref 7–12)
POIKILOCYTES: ABNORMAL
POLYCHROMASIA: ABNORMAL
RBC # BLD: 2.68 E12/L (ref 3.5–5.5)
TEAR DROP CELLS: ABNORMAL
URINE CULTURE, ROUTINE: NORMAL
WBC # BLD: 8.6 E9/L (ref 4.5–11.5)

## 2020-08-05 PROCEDURE — 6370000000 HC RX 637 (ALT 250 FOR IP): Performed by: SPECIALIST

## 2020-08-05 PROCEDURE — 36415 COLL VENOUS BLD VENIPUNCTURE: CPT

## 2020-08-05 PROCEDURE — 93325 DOPPLER ECHO COLOR FLOW MAPG: CPT | Performed by: INTERNAL MEDICINE

## 2020-08-05 PROCEDURE — 6370000000 HC RX 637 (ALT 250 FOR IP): Performed by: INTERNAL MEDICINE

## 2020-08-05 PROCEDURE — 85025 COMPLETE CBC W/AUTO DIFF WBC: CPT

## 2020-08-05 PROCEDURE — 6360000002 HC RX W HCPCS: Performed by: INTERNAL MEDICINE

## 2020-08-05 PROCEDURE — 73110 X-RAY EXAM OF WRIST: CPT

## 2020-08-05 PROCEDURE — 87070 CULTURE OTHR SPECIMN AEROBIC: CPT

## 2020-08-05 PROCEDURE — 2060000000 HC ICU INTERMEDIATE R&B

## 2020-08-05 PROCEDURE — 6360000002 HC RX W HCPCS: Performed by: SPECIALIST

## 2020-08-05 PROCEDURE — 93312 ECHO TRANSESOPHAGEAL: CPT | Performed by: INTERNAL MEDICINE

## 2020-08-05 PROCEDURE — 2580000003 HC RX 258: Performed by: SPECIALIST

## 2020-08-05 PROCEDURE — 2580000003 HC RX 258: Performed by: INTERNAL MEDICINE

## 2020-08-05 PROCEDURE — 2580000003 HC RX 258: Performed by: CLINICAL NURSE SPECIALIST

## 2020-08-05 RX ORDER — LIDOCAINE HYDROCHLORIDE 10 MG/ML
5 INJECTION, SOLUTION EPIDURAL; INFILTRATION; INTRACAUDAL; PERINEURAL ONCE
Status: DISCONTINUED | OUTPATIENT
Start: 2020-08-05 | End: 2020-08-05

## 2020-08-05 RX ORDER — HEPARIN SODIUM (PORCINE) LOCK FLUSH IV SOLN 100 UNIT/ML 100 UNIT/ML
3 SOLUTION INTRAVENOUS EVERY 12 HOURS SCHEDULED
Status: DISCONTINUED | OUTPATIENT
Start: 2020-08-05 | End: 2020-08-06 | Stop reason: HOSPADM

## 2020-08-05 RX ORDER — LIDOCAINE HYDROCHLORIDE 10 MG/ML
5 INJECTION, SOLUTION INFILTRATION; PERINEURAL ONCE
Status: DISCONTINUED | OUTPATIENT
Start: 2020-08-06 | End: 2020-08-06 | Stop reason: HOSPADM

## 2020-08-05 RX ORDER — BENZONATATE 100 MG/1
100 CAPSULE ORAL 3 TIMES DAILY PRN
Qty: 30 CAPSULE | Refills: 0 | Status: SHIPPED | OUTPATIENT
Start: 2020-08-05 | End: 2020-08-15

## 2020-08-05 RX ORDER — BENZONATATE 100 MG/1
100 CAPSULE ORAL 3 TIMES DAILY PRN
Status: DISCONTINUED | OUTPATIENT
Start: 2020-08-05 | End: 2020-08-06 | Stop reason: HOSPADM

## 2020-08-05 RX ORDER — SODIUM CHLORIDE 0.9 % (FLUSH) 0.9 %
10 SYRINGE (ML) INJECTION PRN
Status: DISCONTINUED | OUTPATIENT
Start: 2020-08-05 | End: 2020-08-06 | Stop reason: HOSPADM

## 2020-08-05 RX ORDER — HEPARIN SODIUM (PORCINE) LOCK FLUSH IV SOLN 100 UNIT/ML 100 UNIT/ML
3 SOLUTION INTRAVENOUS PRN
Status: DISCONTINUED | OUTPATIENT
Start: 2020-08-05 | End: 2020-08-06 | Stop reason: HOSPADM

## 2020-08-05 RX ORDER — CLOTRIMAZOLE 10 MG/1
10 LOZENGE ORAL; TOPICAL
Qty: 50 TABLET | Refills: 0 | Status: SHIPPED | OUTPATIENT
Start: 2020-08-05 | End: 2020-08-15

## 2020-08-05 RX ADMIN — PIPERACILLIN AND TAZOBACTAM 3.38 G: 3; .375 INJECTION, POWDER, LYOPHILIZED, FOR SOLUTION INTRAVENOUS at 09:28

## 2020-08-05 RX ADMIN — PIPERACILLIN AND TAZOBACTAM 3.38 G: 3; .375 INJECTION, POWDER, LYOPHILIZED, FOR SOLUTION INTRAVENOUS at 01:23

## 2020-08-05 RX ADMIN — KETOROLAC TROMETHAMINE 30 MG: 30 INJECTION, SOLUTION INTRAMUSCULAR; INTRAVENOUS at 09:33

## 2020-08-05 RX ADMIN — KETOROLAC TROMETHAMINE 30 MG: 30 INJECTION, SOLUTION INTRAMUSCULAR; INTRAVENOUS at 01:29

## 2020-08-05 RX ADMIN — BENZONATATE 100 MG: 100 CAPSULE ORAL at 21:48

## 2020-08-05 RX ADMIN — SODIUM CHLORIDE, PRESERVATIVE FREE 10 ML: 5 INJECTION INTRAVENOUS at 21:49

## 2020-08-05 RX ADMIN — WATER 2 G: 1 INJECTION INTRAMUSCULAR; INTRAVENOUS; SUBCUTANEOUS at 16:02

## 2020-08-05 RX ADMIN — CLOTRIMAZOLE 10 MG: 10 LOZENGE ORAL; TOPICAL at 18:28

## 2020-08-05 RX ADMIN — CLOTRIMAZOLE 10 MG: 10 LOZENGE ORAL; TOPICAL at 21:48

## 2020-08-05 RX ADMIN — CLOTRIMAZOLE 10 MG: 10 LOZENGE ORAL; TOPICAL at 09:28

## 2020-08-05 RX ADMIN — VANCOMYCIN HYDROCHLORIDE 750 MG: 5 INJECTION, POWDER, LYOPHILIZED, FOR SOLUTION INTRAVENOUS at 01:22

## 2020-08-05 RX ADMIN — SODIUM CHLORIDE: 9 INJECTION, SOLUTION INTRAVENOUS at 13:47

## 2020-08-05 RX ADMIN — ACETAMINOPHEN 650 MG: 325 TABLET, FILM COATED ORAL at 09:33

## 2020-08-05 RX ADMIN — CLOTRIMAZOLE 10 MG: 10 LOZENGE ORAL; TOPICAL at 01:23

## 2020-08-05 RX ADMIN — CLOTRIMAZOLE 10 MG: 10 LOZENGE ORAL; TOPICAL at 16:03

## 2020-08-05 RX ADMIN — VANCOMYCIN HYDROCHLORIDE 750 MG: 5 INJECTION, POWDER, LYOPHILIZED, FOR SOLUTION INTRAVENOUS at 09:28

## 2020-08-05 RX ADMIN — CLOTRIMAZOLE 10 MG: 10 LOZENGE ORAL; TOPICAL at 10:52

## 2020-08-05 RX ADMIN — SODIUM CHLORIDE: 9 INJECTION, SOLUTION INTRAVENOUS at 13:00

## 2020-08-05 RX ADMIN — KETOROLAC TROMETHAMINE 30 MG: 30 INJECTION, SOLUTION INTRAMUSCULAR; INTRAVENOUS at 16:02

## 2020-08-05 RX ADMIN — KETOROLAC TROMETHAMINE 30 MG: 30 INJECTION, SOLUTION INTRAMUSCULAR; INTRAVENOUS at 21:48

## 2020-08-05 ASSESSMENT — PAIN DESCRIPTION - FREQUENCY: FREQUENCY: CONTINUOUS

## 2020-08-05 ASSESSMENT — PAIN DESCRIPTION - ORIENTATION
ORIENTATION: LEFT

## 2020-08-05 ASSESSMENT — PAIN DESCRIPTION - ONSET: ONSET: ON-GOING

## 2020-08-05 ASSESSMENT — PAIN SCALES - GENERAL
PAINLEVEL_OUTOF10: 0
PAINLEVEL_OUTOF10: 9
PAINLEVEL_OUTOF10: 7
PAINLEVEL_OUTOF10: 2
PAINLEVEL_OUTOF10: 8
PAINLEVEL_OUTOF10: 7
PAINLEVEL_OUTOF10: 0

## 2020-08-05 ASSESSMENT — PAIN DESCRIPTION - LOCATION
LOCATION: BACK;NECK
LOCATION: WRIST

## 2020-08-05 ASSESSMENT — PAIN DESCRIPTION - DESCRIPTORS
DESCRIPTORS: ACHING;CONSTANT
DESCRIPTORS: DISCOMFORT;TENDER
DESCRIPTORS: ACHING

## 2020-08-05 ASSESSMENT — PAIN - FUNCTIONAL ASSESSMENT: PAIN_FUNCTIONAL_ASSESSMENT: PREVENTS OR INTERFERES SOME ACTIVE ACTIVITIES AND ADLS

## 2020-08-05 ASSESSMENT — PAIN DESCRIPTION - PROGRESSION: CLINICAL_PROGRESSION: NOT CHANGED

## 2020-08-05 ASSESSMENT — PAIN DESCRIPTION - PAIN TYPE
TYPE: ACUTE PAIN

## 2020-08-05 NOTE — TELEPHONE ENCOUNTER
Pt was a former pt of Dr. Sima Torres and called wanting to re-establish care as a new pt with Dr. Kandi Kelly. Pt has been seen since 2015, please advise to schedule .

## 2020-08-05 NOTE — PROGRESS NOTES
No new GYN complaints today. Denies any significant vaginal bleeding. Still spotting occasionally. No foul-smelling discharge    Chart reviewed    Blood pressure 120/84, pulse 89, temperature 98.5 °F (36.9 °C), temperature source Oral, resp. rate 18, height 5' 1\" (1.549 m), weight 110 lb (49.9 kg), SpO2 97 %, unknown if currently breastfeeding. Assessment  Septic , under treatment    Plan  Continue current antibiotics.

## 2020-08-05 NOTE — PLAN OF CARE
Problem: Pain:  Goal: Pain level will decrease  Description: Pain level will decrease  8/5/2020 1732 by Serina Olmstead RN  Outcome: Met This Shift  8/5/2020 1004 by Charly Garcia RN  Outcome: Met This Shift  Goal: Control of acute pain  Description: Control of acute pain  8/5/2020 1732 by Serina Olmstead RN  Outcome: Met This Shift  8/5/2020 1004 by Charly Garcia RN  Outcome: Met This Shift  Goal: Control of chronic pain  Description: Control of chronic pain  Outcome: Met This Shift

## 2020-08-05 NOTE — PLAN OF CARE
Problem: Pain:  Goal: Pain level will decrease  Description: Pain level will decrease  Outcome: Met This Shift     Problem: Pain:  Goal: Control of acute pain  Description: Control of acute pain  Outcome: Met This Shift

## 2020-08-05 NOTE — TELEPHONE ENCOUNTER
This patient can be scheduled to re-establish, but she is currently admitted to [address] [place] [address],[address] for pneumonia. I cannot see her in the office for 2 weeks after hospitalization, but she can be scheduled for a virtual hospital follow up sooner than that.

## 2020-08-05 NOTE — PROGRESS NOTES
[address] [facility]  [facility]  Progress Note    NAME:Lauren Boyce  1995  DATE OF SERVICE:20    Pt was seen FACE TO FACE FOR Beta Hemolytic Streptococcus SEPTICEMIA    SUBJECTIVE:  Chief Complaint   Patient presents with    Generalized Body Aches    Fever     102.9 oral triage no tylenol today    C/O SORE THROAT FOR OSIRIS  C/o cough  Patient is tolerating medications. No reported adverse drug reactions. Review of systems:  As stated above in the chief complaint, otherwise negative. Medications:  Scheduled Meds:   vancomycin  750 mg Intravenous Q8H    clotrimazole  10 mg Oral 5x Daily    piperacillin-tazobactam  3.375 g Intravenous Q8H     Continuous Infusions:   0.9% NaCl with KCl 40 mEq 100 mL/hr at 20 1148    sodium chloride Stopped (20 2340)     PRN Meds:albuterol, ondansetron, acetaminophen, ketorolac    OBJECTIVE:  /69   Pulse 105   Temp 98.3 °F (36.8 °C) (Oral)   Resp 17   Ht 5' 1\" (1.549 m)   Wt 110 lb (49.9 kg)   SpO2 96%   BMI 20.78 kg/m²   Temp  Av.3 °F (36.8 °C)  Min: 97.8 °F (36.6 °C)  Max: 98.8 °F (37.1 °C)  Constitutional:  The patient is awake, alert, and oriented. Skin:    Warm and dry. No rashes were noted. HEENT:   Round and reactive pupils. AT/NC ? THRUSH. Chest:   No use of accessory muscles to breathe. Symmetrical expansion. Cardiovascular:  S1 and S2 are rhythmic and regular. No murmurs appreciated. Abdomen:   Positive bowel sounds to auscultation. Benign to palpation. Extremities:   No clubbing, no cyanosis, no edema.   CNS    AAxO   Lines: pIV    Radiology:  Laboratory and Tests Review:  Lab Results   Component Value Date    WBC 8.6 2020    WBC 6.5 2020    WBC 3.5 (L) 2020    HGB 8.1 (L) 2020    HCT 23.7 (L) 2020    MCV 88.4 2020    PLT 95 (L) 2020     No results found for: [facility]  Lab Results   Component Value Date    ALT 12 2020    AST 20 2020 ALKPHOS 63 08/03/2020    BILITOT 1.5 (H) 08/03/2020     Lab Results   Component Value Date     08/03/2020    K 3.5 08/03/2020    K 2.9 08/02/2020     08/03/2020    CO2 19 08/03/2020    BUN 13 08/03/2020    CREATININE 0.7 08/03/2020    CREATININE 0.8 08/02/2020    CREATININE 0.6 01/08/2015    GFRAA >60 08/03/2020    LABGLOM >60 08/03/2020    GLUCOSE 102 08/03/2020    PROT 5.2 08/03/2020    LABALBU 2.3 08/03/2020    CALCIUM 7.3 08/03/2020    BILITOT 1.5 08/03/2020    ALKPHOS 63 08/03/2020    AST 20 08/03/2020    ALT 12 08/03/2020     Lab Results   Component Value Date    CRP 27.9 (H) 08/04/2020     Lab Results   Component Value Date    SEDRATE 45 (H) 08/04/2020       Microbiology:   Recent Labs     08/02/20  1453   COVID19 Not Detected     Lab Results   Component Value Date    BLOODCULT2  08/02/2020     Gram stain performed from blood culture bottle media  Gram positive cocci in chains      BLOODCULT2  08/02/2020     Refer to previous culture for susceptibility results  of CXBL collected 08/02/2020 at 14:53      ORG Beta Hemolytic Streptococcus 08/02/2020    ORG Beta Hemolytic Streptococcus 08/02/2020      Recent Labs     08/02/20  1617   ORG Beta Hemolytic Streptococcus*     Recent Labs     08/02/20  1453 08/02/20  1617   ORG Beta Hemolytic Streptococcus* Beta Hemolytic Streptococcus*     Recent Labs     08/02/20  1453 08/02/20  1617 08/02/20  2130   LABURIN  --   --  <10,000 CFU/mL  Mixed gram positive organisms     ORG Beta Hemolytic Streptococcus* Beta Hemolytic Streptococcus*  --          ASSESSMENT/PLAN:  SEPSIS  PANCYTOPENIA   Beta Hemolytic Streptococcus/Streptococcus pyogenes  SEPSIS FOR OSIRIS  ? THRUSH  cough    Orders Placed This Encounter   Medications     piperacillin-tazobactam (ZOSYN) 3.375 g in dextrose 5 % 50 mL IVPB extended infusion (mini-bag)    vancomycin (VANCOCIN) 750 mg in dextrose 5 % 250 mL IVPB      CAN PROB NARROW ATBX IN AM awaiting final sensi  OSIRIS No OSIRIS indication of

## 2020-08-05 NOTE — PROGRESS NOTES
decisions. I also discussed the differential diagnosis and all of the proposed management plans with the patient and individuals accompanying the patient. Lauren requires this high level of physician care and nursing on the IMC/Telemetry unit due the complexity of decision management and chance of rapid decline or death. Continued cardiac monitoring and higher level of nursing are required. I am readily available for any further decision-making and intervention.      Sarita Seymour DO, F.A.C.O.I.  8/5/2020  11:20 AM

## 2020-08-05 NOTE — CONSULTS
Department of Orthopedic Surgery  Resident Consult Note          Reason for Consult:  Left wrist pain     HISTORY OF PRESENT ILLNESS:       Patient is a 21 y.o. female who presents with left wrist pain. Pt. Admitted for sepsis and pancytopenia secondary to recent elective . Pt. States left wrist has been hurting her somewhat since Saturday and she has had swelling since then. Pain is non-specific and mild to moderate in nature. Pt. Denies any numbness/tingling/paresthesias. She is able to move the wrist and use her hand still. She is right hand dominant. She is complaining of diffuse myalgias otherwise. Denies any other orthopedic complaints at this time.       Past Medical History:        Diagnosis Date    Abnormal Pap smear     False labor before 37 completed weeks of gestation in third trimester 2019    Intrauterine growth restriction affecting care of mother, antepartum, third trimester, not applicable or unspecified fetus 2019    Iron deficiency anemia 2015     (spontaneous vaginal delivery) 3/4/2019     Past Surgical History:        Procedure Laterality Date    TRANSESOPHAGEAL ECHOCARDIOGRAM N/A 2020    TRANSESOPHAGEAL ECHOCARDIOGRAM performed by Thomas Salazar MD at [facility] ENDOSCOPY     Current Medications:   Current Facility-Administered Medications: lidocaine PF 1 % injection 5 mL, 5 mL, Intradermal, Once  sodium chloride flush 0.9 % injection 10 mL, 10 mL, Intravenous, PRN  heparin flush 100 UNIT/ML injection 300 Units, 3 mL, Intravenous, 2 times per day  heparin flush 100 UNIT/ML injection 300 Units, 3 mL, Intracatheter, PRN  benzonatate (TESSALON) capsule 100 mg, 100 mg, Oral, TID PRN  cefTRIAXone (ROCEPHIN) 2 g in sterile water 20 mL IV syringe, 2 g, Intravenous, Q24H  clotrimazole (MYCELEX) bruna 10 mg, 10 mg, Oral, 5x Daily  0.9% NaCl with KCl 40 mEq infusion, , Intravenous, Continuous  albuterol (PROVENTIL) nebulizer solution 2.5 mg, 2.5 mg, Nebulization, Q6H PRN  ondansetron (ZOFRAN) injection 4 mg, 4 mg, Intravenous, Q6H PRN  acetaminophen (TYLENOL) tablet 650 mg, 650 mg, Oral, Q4H PRN  ketorolac (TORADOL) injection 30 mg, 30 mg, Intravenous, Q6H PRN  Allergies:  Patient has no known allergies. Social History:   TOBACCO:   reports that she has been smoking. She has never used smokeless tobacco.  ETOH:   reports no history of alcohol use. DRUGS:   reports no history of drug use. ACTIVITIES OF DAILY LIVING:    OCCUPATION:    Family History:   No family history on file. REVIEW OF SYSTEMS:  CONSTITUTIONAL:  negative for  fevers, chills  EYES:  negative for blurred vision, visual disturbance  HEENT:  negative for  hearing loss, voice change  RESPIRATORY:  negative for  dyspnea, wheezing  CARDIOVASCULAR:  negative for  chest pain, palpitations  GASTROINTESTINAL:  negative for nausea, vomiting  GENITOURINARY:  negative for frequency, urinary incontinence  HEMATOLOGIC/LYMPHATIC:  negative for bleeding and petechiae  MUSCULOSKELETAL:  positive for  myalgias, pain, joint swelling and stiff joints  NEUROLOGICAL:  negative for headaches, dizziness  BEHAVIOR/PSYCH:  negative for increased agitation and anxiety    PHYSICAL EXAM:    VITALS:  /84   Pulse 89   Temp 98.5 °F (36.9 °C) (Oral)   Resp 18   Ht 5' 1\" (1.549 m)   Wt 110 lb (49.9 kg)   SpO2 97%   BMI 20.78 kg/m²   CONSTITUTIONAL:  awake, alert, cooperative, no apparent distress, and appears stated age  MUSCULOSKELETAL:  Left upper Extremity:   Skin intact circumferentially, no redness/warmth/erythema when compared to contralateral side, no palpable fluctuance noted  Positive TTP about the distal radius, negative about distal ulna, able to range pt.  In flexion and extension at wrist with only minimal pain  Swelling in hand and wrist when compared to contralateral side   Comparments soft and compressible  +AIN/PIN/Ulnar/Median/Radial nerve function intact grossly  +2/4 Radial pulse, Cap refill <2sec  Distal

## 2020-08-05 NOTE — HOME CARE
RECEIVED REFERRAL FOR POSSIBLE IV AT D/C RAN BENEFITS AND PATIENT IS COVERED % HOWEVER CALLED OUT TO DR. Macy Kirby OFFICE AND PATIENT HASN'T BEEN SEEN SINCE 2015 SO SHE HAS NO PCP. UPDATED [facility] BRAD AND SHE WILL LOOK INTO SETTING UP PCP APPT AND POSSIBLE DR. COHN TO FOLLOW C UNTIL ESTABLISHED.      Tariq Rosales LPN   [facility] CARE

## 2020-08-05 NOTE — PROGRESS NOTES
Pharmacy Consultation Note  (Antibiotic Dosing and Monitoring)    Initial consult date: 8/2/2020  Consulting physician: Dr. Uri Flores  Drug(s): Vancomycin  Indication: Bacteremia    Ht Readings from Last 1 Encounters:   08/02/20 5' 1\" (1.549 m)     Wt Readings from Last 1 Encounters:   08/02/20 110 lb (49.9 kg)     Age/  Gender IBW DW  Allergy Information   21 y.o.   female 47.8 kg 49.9 kg  Patient has no known allergies. Date  Tmax WBC BUN/CR UOP  (mL/kg/hr) Drug/Dose Time   Given Level(s)   (Time) Comments   8/2  (#1) 102.9 4.5 9/0.8 -- Vancomycin 750 mg IV q12hr 2223     8/3  (#2) afebrile 3.5 13/0.7 -- Vancomycin 750 mg IV q12hr 1012  2112     8/4  (#3) afebrile 6.5 -- -- Vancomycin 750 mg IV q12hr    Vancomycin 750 mg IV q8hr 0813    1542 Trough @ 0819 = 5.5 mcg/mL    8/5  (#4) 100 8.6 -- -- Vancomycin 750 mg IV q8hr 0122  0928  <1730> Trough @ <1700> =       (#5)             (#6)             (#7)             Estimated Creatinine Clearance: 93 mL/min (based on SCr of 0.7 mg/dL). UOP over the past 24 hours:       Intake/Output Summary (Last 24 hours) at 8/5/2020 1245  Last data filed at 8/5/2020 1228  Gross per 24 hour   Intake 660 ml   Output --   Net 660 ml       Other anti-infectives: Anti-infective Dose Date Initiated Date Stopped   Azithromycin  500 mg IV x 1 8/2 8/2   Ceftriaxone 2g IV x 1 8/2 8/2   Pip/tazo 3.375g IV q8hr 8/2      Cultures:  available culture and sensitivity results were reviewed in EPIC  Cultures sent and are pending. Culture Date Result    Strep/legionella urine antigen 8/2 Negative   Urine cx 8/2    Respiratory panel 8/2 Not detected   Blood cx 1 8/2 Strep pyogenes (Beta Strep Group A)   Blood cx 2 8/2  Beta Hemolytic Streptococcus   COVID-19 8/2 Not detected     Assessment:  · Consulted by Dr. Uri Montenegrin to dose/monitor vancomycin  · Goal trough level:  15-20 mcg/mL  · Pt is a 21 yoF who presented from home with fever and abdominal pain.  Blood cultures positive for GPC in chains, Strep species by PCR. · Serum creatinine 8/3: 0.7 mg/dL; CrCl ~ 93 mL/min; baseline Scr unknown  · 8/4: Trough = 5.5 mcg/mL    Plan:  · Continue vancomycin 750 mg IV q8hr  · Trough at 1700 today. Hold dose for level > 20 mcg/mL  · Consider de-escalation  · Follow renal function  · Pharmacist will follow and monitor/adjust dosing as necessary      Thank you for the consult,    Garrett Israel PharmD, BCPS 8/5/2020 12:45 PM   Ext: [phone]    Addendum    Vancomycin has been discontinued; pharmacy will sign-off. Please reconsult if needed.     Thank you,    Garrett Israel PharmD, BCPS 8/5/2020 3:50 PM   Ext: [phone]

## 2020-08-06 VITALS
RESPIRATION RATE: 16 BRPM | TEMPERATURE: 98.4 F | BODY MASS INDEX: 20.77 KG/M2 | HEART RATE: 88 BPM | SYSTOLIC BLOOD PRESSURE: 110 MMHG | WEIGHT: 110 LBS | DIASTOLIC BLOOD PRESSURE: 71 MMHG | HEIGHT: 61 IN | OXYGEN SATURATION: 94 %

## 2020-08-06 LAB
ANION GAP SERPL CALCULATED.3IONS-SCNC: 9 MMOL/L (ref 7–16)
BASOPHILS ABSOLUTE: 0 E9/L (ref 0–0.2)
BASOPHILS RELATIVE PERCENT: 0.4 % (ref 0–2)
BUN BLDV-MCNC: 7 MG/DL (ref 6–20)
BURR CELLS: ABNORMAL
CALCIUM SERPL-MCNC: 7.7 MG/DL (ref 8.6–10.2)
CHLORIDE BLD-SCNC: 111 MMOL/L (ref 98–107)
CO2: 18 MMOL/L (ref 22–29)
CREAT SERPL-MCNC: 0.6 MG/DL (ref 0.5–1)
CULTURE, BLOOD 2: ABNORMAL
EOSINOPHILS ABSOLUTE: 0.51 E9/L (ref 0.05–0.5)
EOSINOPHILS RELATIVE PERCENT: 4.3 % (ref 0–6)
GFR AFRICAN AMERICAN: >60
GFR NON-AFRICAN AMERICAN: >60 ML/MIN/1.73
GLUCOSE BLD-MCNC: 84 MG/DL (ref 74–99)
HCT VFR BLD CALC: 24.3 % (ref 34–48)
HEMOGLOBIN: 7.8 G/DL (ref 11.5–15.5)
HYPOCHROMIA: ABNORMAL
LYMPHOCYTES ABSOLUTE: 1.07 E9/L (ref 1.5–4)
LYMPHOCYTES RELATIVE PERCENT: 8.7 % (ref 20–42)
MCH RBC QN AUTO: 29.1 PG (ref 26–35)
MCHC RBC AUTO-ENTMCNC: 32.1 % (ref 32–34.5)
MCV RBC AUTO: 90.7 FL (ref 80–99.9)
MONOCYTES ABSOLUTE: 1.19 E9/L (ref 0.1–0.95)
MONOCYTES RELATIVE PERCENT: 10.4 % (ref 2–12)
MYELOCYTE PERCENT: 1.7 % (ref 0–0)
NEUTROPHILS ABSOLUTE: 9.16 E9/L (ref 1.8–7.3)
NEUTROPHILS RELATIVE PERCENT: 74.8 % (ref 43–80)
ORGANISM: ABNORMAL
OVALOCYTES: ABNORMAL
PDW BLD-RTO: 15.9 FL (ref 11.5–15)
PLATELET # BLD: 136 E9/L (ref 130–450)
PMV BLD AUTO: 12.7 FL (ref 7–12)
POIKILOCYTES: ABNORMAL
POLYCHROMASIA: ABNORMAL
POTASSIUM SERPL-SCNC: 4.7 MMOL/L (ref 3.5–5)
RBC # BLD: 2.68 E12/L (ref 3.5–5.5)
SODIUM BLD-SCNC: 138 MMOL/L (ref 132–146)
WBC # BLD: 11.9 E9/L (ref 4.5–11.5)

## 2020-08-06 PROCEDURE — 05HA33Z INSERTION OF INFUSION DEVICE INTO LEFT BRACHIAL VEIN, PERCUTANEOUS APPROACH: ICD-10-PCS | Performed by: INTERNAL MEDICINE

## 2020-08-06 PROCEDURE — 6360000002 HC RX W HCPCS: Performed by: SPECIALIST

## 2020-08-06 PROCEDURE — 2580000003 HC RX 258: Performed by: SPECIALIST

## 2020-08-06 PROCEDURE — 6370000000 HC RX 637 (ALT 250 FOR IP): Performed by: SPECIALIST

## 2020-08-06 PROCEDURE — 36569 INSJ PICC 5 YR+ W/O IMAGING: CPT

## 2020-08-06 PROCEDURE — 85025 COMPLETE CBC W/AUTO DIFF WBC: CPT

## 2020-08-06 PROCEDURE — C1751 CATH, INF, PER/CENT/MIDLINE: HCPCS

## 2020-08-06 PROCEDURE — 80048 BASIC METABOLIC PNL TOTAL CA: CPT

## 2020-08-06 PROCEDURE — 6360000002 HC RX W HCPCS: Performed by: INTERNAL MEDICINE

## 2020-08-06 PROCEDURE — 36415 COLL VENOUS BLD VENIPUNCTURE: CPT

## 2020-08-06 PROCEDURE — 76937 US GUIDE VASCULAR ACCESS: CPT

## 2020-08-06 RX ADMIN — KETOROLAC TROMETHAMINE 30 MG: 30 INJECTION, SOLUTION INTRAMUSCULAR; INTRAVENOUS at 05:04

## 2020-08-06 RX ADMIN — CLOTRIMAZOLE 10 MG: 10 LOZENGE ORAL; TOPICAL at 06:23

## 2020-08-06 RX ADMIN — CLOTRIMAZOLE 10 MG: 10 LOZENGE ORAL; TOPICAL at 12:13

## 2020-08-06 RX ADMIN — POTASSIUM CHLORIDE AND SODIUM CHLORIDE: 900; 300 INJECTION, SOLUTION INTRAVENOUS at 05:10

## 2020-08-06 RX ADMIN — WATER 2 G: 1 INJECTION INTRAMUSCULAR; INTRAVENOUS; SUBCUTANEOUS at 14:02

## 2020-08-06 RX ADMIN — CLOTRIMAZOLE 10 MG: 10 LOZENGE ORAL; TOPICAL at 14:02

## 2020-08-06 ASSESSMENT — PAIN SCALES - GENERAL
PAINLEVEL_OUTOF10: 0
PAINLEVEL_OUTOF10: 7

## 2020-08-06 ASSESSMENT — PAIN DESCRIPTION - PAIN TYPE: TYPE: ACUTE PAIN

## 2020-08-06 ASSESSMENT — PAIN SCALES - WONG BAKER
WONGBAKER_NUMERICALRESPONSE: 0
WONGBAKER_NUMERICALRESPONSE: 0

## 2020-08-06 ASSESSMENT — PAIN DESCRIPTION - DESCRIPTORS: DESCRIPTORS: ACHING

## 2020-08-06 ASSESSMENT — PAIN DESCRIPTION - LOCATION: LOCATION: NECK;BACK

## 2020-08-06 NOTE — PROGRESS NOTES
[address] [facility]  [facility]  Progress Note    NAME:Lauren Au Junior  1995  DATE OF SERVICE:20    Pt was seen FACE TO FACE FOR Beta Hemolytic Streptococcus SEPTICEMIA    SUBJECTIVE:  Chief Complaint   Patient presents with    Generalized Body Aches    Fever     102.9 oral triage no tylenol today     s/p PICC  C/o cough  Patient is tolerating medications. No reported adverse drug reactions. Review of systems:  As stated above in the chief complaint, otherwise negative. Medications:  Scheduled Meds:   heparin flush  3 mL Intravenous 2 times per day    cefTRIAXone (ROCEPHIN) IV  2 g Intravenous Q24H    lidocaine  5 mL Intradermal Once    clotrimazole  10 mg Oral 5x Daily     Continuous Infusions:   0.9% NaCl with KCl 40 mEq 100 mL/hr at 20 0510     PRN Meds:sodium chloride flush, heparin flush, benzonatate, albuterol, ondansetron, acetaminophen, ketorolac    OBJECTIVE:  /71   Pulse 88   Temp 98.4 °F (36.9 °C) (Temporal)   Resp 16   Ht 5' 1\" (1.549 m)   Wt 110 lb (49.9 kg)   SpO2 94% Comment: ROOM AIR  BMI 20.78 kg/m²   Temp  Av.5 °F (36.9 °C)  Min: 98.2 °F (36.8 °C)  Max: 98.7 °F (37.1 °C)  Constitutional:  The patient is awake, alert, and oriented. Skin:    Warm and dry. HEENT:   Round and reactive pupils. AT/NC   Chest:   No use of accessory muscles to breathe. Symmetrical expansion. Cardiovascular:  S1 and S2 are rhythmic and regular. No murmurs appreciated. Abdomen:   Positive bowel sounds to auscultation. Benign to palpation. distended   Extremities:    no edema.   CNS    AAxO   Lines: pICC lue    Radiology:  Laboratory and Tests Review:  Lab Results   Component Value Date    WBC 11.9 (H) 2020    WBC 8.6 2020    WBC 6.5 2020    HGB 7.8 (L) 2020    HCT 24.3 (L) 2020    MCV 90.7 2020     2020     No results found for: [facility]  Lab Results   Component Value Date    ALT 12 2020 AST 20 08/03/2020    ALKPHOS 63 08/03/2020    BILITOT 1.5 (H) 08/03/2020     Lab Results   Component Value Date     08/06/2020    K 4.7 08/06/2020    K 2.9 08/02/2020     08/06/2020    CO2 18 08/06/2020    BUN 7 08/06/2020    CREATININE 0.6 08/06/2020    CREATININE 0.7 08/03/2020    CREATININE 0.8 08/02/2020    GFRAA >60 08/06/2020    LABGLOM >60 08/06/2020    GLUCOSE 84 08/06/2020    PROT 5.2 08/03/2020    LABALBU 2.3 08/03/2020    CALCIUM 7.7 08/06/2020    BILITOT 1.5 08/03/2020    ALKPHOS 63 08/03/2020    AST 20 08/03/2020    ALT 12 08/03/2020     Lab Results   Component Value Date    CRP 27.9 (H) 08/04/2020     Lab Results   Component Value Date    SEDRATE 45 (H) 08/04/2020       Microbiology:   No results for input(s): COVID19 in the last 72 hours. Lab Results   Component Value Date    BLOODCULT2 24 Hours no growth 08/04/2020    BLOODCULT2  08/02/2020     Refer to previous culture for susceptibility results  of CXBL collected 08/02/2020 at 14:53      ORG Strep pyogenes (Beta Strep Group A) 08/02/2020    ORG Strep pyogenes (Beta Strep Group A) 08/02/2020      No results for input(s): CXSURG, ORG in the last 72 hours. No results for input(s): WNDABS, ORG in the last 72 hours. No results for input(s): Claudette Ambrosia in the last 72 hours. ASSESSMENT/PLAN:  SEPSIS  PANCYTOPENIA improved  Beta Hemolytic Streptococcus/Streptococcus pyogenes      ? THRUSH  cough       CAN PROB NARROW ATBX IN AM awaiting final sensi  OSIRIS No OSIRIS indication of endocarditis, mild MR and mild TR  picc LUE  Can d/c  F/u 2 weeks     benzonatate (TESSALON) capsule 100 mg, TID PRN  cefTRIAXone (ROCEPHIN) 2 g in sterile water 20 mL IV syringe, Q24H     clotrimazole (MYCELEX) bruna 10 mg, 5x Daily       · Monitor labs    Imaging and labs were reviewed per medical records. The patient was educated about the diagnosis, prognosis, indications, risks and benefits of treatment.      An opportunity to ask questions was given to the patient/FAMILY. Thank you for involving me in the care of Sherrell Friday I will continue to follow. Please do not hesitate to call for any questions or concerns.     Electronically signed by Julio Monroe MD on 8/6/2020 at 10:40 AM

## 2020-08-06 NOTE — PLAN OF CARE
HPI     Octavia Arrington is a/an 59 y.o. female who comes in upon consult from her   neurologist, Dr. Gerard Cavazos.  Octavia sustained a concussion (without LOC)   in March 2019 (7 months ago).  She hit the center of her forehead on a   shelf at work.. Initial symptoms included blurry vision, headache, neck   ache.  Dr. Cavazos diagnosed whiplash and convergence insufficiency and   established physical and occupational therapy.  Occupational eye therapy   consisted of pursuit and NPC exercises. Headaches have been treated with   daily topomax (to which Octavia admits mediocre adherence).Today Octavia   reports to be still struggling most with blurry vision. She explains that   words (near) are blurry.  I am unable to elicit specific   complaint/decsription/ verification of involvement of distance vision.    Instead she relays that the blurry vision is here and there and she   doesn't pay attention to this with extreme detail.  She does, however,   state that over the past 2-3 weeks, her headaches have increased in   intensity:  Headaches:   Onset: Time of initial concussion with recent exacerbation in last 2-3   weeks   Duration: Intermittent (unable to elicit clear timeline/duration)   Frequency: Intermittent (unable to elicit clear timeline)   Location: top of head   Pain quality/severity: 3-4/10 to 1/10; pressure   Associated factors: (--)nausea, (--)dizziness (gets lightheaded at works   at times),       (--)photophobia, (--) phonophobia       (--)visual scotoma,      (--)blurred vision - not specifically with headache; Relief with   excedirn (takes topomax 2-3 x week - dosed for daily usage)          Last edited by Ning Cornejo, OD on 10/7/2019  4:44 PM. (History)        Review of Systems   Constitutional: Negative for chills, fever and malaise/fatigue.   HENT: Negative for congestion and hearing loss.    Eyes: Positive for blurred vision, double vision and pain. Negative for photophobia, discharge and redness.  Problem: Pain:  Goal: Pain level will decrease  Description: Pain level will decrease  8/6/2020 1235 by Serena Lakhani  Outcome: Completed  8/6/2020 0212 by Ok Bradley  Outcome: Met This Shift  Goal: Control of acute pain  Description: Control of acute pain  8/6/2020 1235 by Serena Lakhani  Outcome: Completed  8/6/2020 0212 by Ok Bradley  Outcome: Met This Shift  Goal: Control of chronic pain  Description: Control of chronic pain  8/6/2020 1235 by Serena Lakhani  Outcome: Completed  8/6/2020 0212 by Ok Bradley  Outcome: Met This Shift     Problem: Falls - Risk of:  Goal: Will remain free from falls  Description: Will remain free from falls  8/6/2020 1235 by Serena Lakhani  Outcome: Completed  8/6/2020 0212 by kO Bradley  Outcome: Met This Shift  Goal: Absence of physical injury  Description: Absence of physical injury  8/6/2020 1235 by Serena Lakhani  Outcome: Completed  8/6/2020 0212 by Ok Bradley  Outcome: Met This Shift   Respiratory: Negative.    Cardiovascular: Negative.    Gastrointestinal: Negative.    Genitourinary: Negative.    Musculoskeletal: Negative.    Skin: Negative.    Neurological: Positive for headaches. Negative for seizures.   Endo/Heme/Allergies: Negative for environmental allergies.   Psychiatric/Behavioral: Negative.        For exam results, see encounter report    Assessment /Plan     1. Esotropia, intermittent --> decompensating secondary to decreased vergences (partly due to age/ partly due to concussion)  - Prism in glasses    2. Hypertropia of right eye --> decompensating secondary to decreased vergences (partly due to age/ partly due to concussion)  - Prism in glasses    3. Refractive error with Presbyopia  - Spec Rx per final Rx below  Glasses Prescription (10/7/2019)        Sphere Cylinder Dist VA Add Horz Prism Vert Prism    Right +0.50 Sphere 20/20 +2.75 1.0 out 0.5 down    Left -0.25 Sphere 20/20 +2.75 1.0 out 0.5 up    Type:  PAL    Expiration Date:  10/7/2020          4.  Anterior uveitis - Both Eyes  -  prednisoLONE acetate (PRED FORTE) 1 % DrpS; Place 1 drop into both eyes 4 (four) times daily.  Dispense: 5 mL; Refill: 0  - Lab workup    5. Essential hypertension without retinopathy  - No ocular  treatment needed; monitor annually        Parent education and Patient education; RTC in 1 week for uveitis check

## 2020-08-06 NOTE — CARE COORDINATION
SOCIAL WORK / DISCHARGE PLANNING:  Pt to discharge home today with picc line, IV Rocephin x 2 weeks. Script faxed to [facility] and notified Leigha Matthew 77 rep of dc. HHC order will be needed. Dc after 2-3pm IV dose, service to start next date.                Electronically signed by SARKIS Mitchell on 8/6/2020 at 10:47 AM

## 2020-08-06 NOTE — PLAN OF CARE
Problem: Pain:  Goal: Pain level will decrease  Description: Pain level will decrease  8/6/2020 0212 by Arron Goodman  Outcome: Met This Shift  8/5/2020 1732 by Medardo Armas RN  Outcome: Met This Shift  Goal: Control of acute pain  Description: Control of acute pain  8/6/2020 0212 by Arron Goodman  Outcome: Met This Shift  8/5/2020 1732 by Medardo Armas RN  Outcome: Met This Shift  Goal: Control of chronic pain  Description: Control of chronic pain  8/6/2020 0212 by Arron Goodman  Outcome: Met This Shift  8/5/2020 1732 by Medardo Armas RN  Outcome: Met This Shift

## 2020-08-06 NOTE — PROCEDURES
Vascular Access Procedure Note    Procedure Date:   8/6/2020    Pre-procedure Verification/Time-Out:  The proposed risks versus benefits of this procedure were discussed in detail by the physician. consent was obtained from [facility]. All necessary equipment for procedure is available at time of procedure yes  An audible time out was done at 0830 by rosalia tineo rn, correctly identifying patients name, medical record number. members of the procedure team all in agreement. Indication for Procedure:   Reason for Insertion: home antibiotics        Procedure:   Reason for Consultation: picc line placement    Clinician Performing Procedure:   Natacha Salgado rn    Assistant:  none      Procedure Details/Findings:  Catheter Czech Size: 5  Lot Number: [id number]  Product #: [id number]  Expiration Date: 05/31/2021  Maximum Barrier Precautions: YES  Technique: ultrasound guided with VPS  Attempts: 1  Exposed (cm): 0CM  Total (cm): 32CM  Placement Site: LEFT BRACHIAL VEIN  Vessel Size: 0.55CM  Dressing: securement device and transparent dressing  Blood Return:YES  Ultrasound Guidance:YES  Arm Circumference Mid-Bicep (cm):24.5CM  Chest X-Ray Ordered: not applicable  End VFFTTZJYB:27UM      Complications:   NONE     Post-operative Condition:  STABLE  Patient Tolerated Procedure: WELL     Comments/Post-operative Education: DRSG SHOULD REMAIN DRY AND INTACT, NO SHOWERING, NO HEAVY LIFTING WITH LEFT ARM.   Post Procedure Interventions: NONE    Natacha Salgado  08/06/20  9:28 AM

## 2020-08-06 NOTE — DISCHARGE SUMMARY
Internal Medicine Progress Note     BILLY=[facility]     [facility]. Gonzalez Adam., BRIANNEORaffaeleI. Tanya Lares D.O., NILA Maguire D.O. Janice Blackmon, MSN, APRN, NP-C  Willy Gastelum. Mine Larson, MSN, APRN-CNP       Internal Medicine  Discharge Summary    NAME: Shabbir Alvarez  :  1995  MRN:  [id number]  PCP:Darrel Beavers MD  ADMITTED: 2020      DISCHARGED: 20    ADMITTING PHYSICIAN: Amy Angel DO    CONSULTANT(S):   IP CONSULT TO INFECTIOUS DISEASES  IP CONSULT TO PHARMACY  IP CONSULT TO OB GYN  IP CONSULT TO CARDIOLOGY  IP CONSULT TO ORTHOPEDIC SURGERY  IP CONSULT TO SOCIAL WORK     ADMITTING DIAGNOSIS:   Sepsis ([facility] [address] [A41.9]     DISCHARGE DIAGNOSES:   1. Sepsis secondary to beta-hemolytic Streptococcus bacteremia in the setting of recent elective   2. Pancytopenia in the setting of sepsis  3. Moderate protein calorie malnutrition    BRIEF HISTORY OF PRESENT ILLNESS:   Sharon Jimenez is a 70-year-old -American female who presented to the emergency department with complaint of fever. Patient states that she underwent elective  at 15 weeks gestation 4 days prior in [place]. States that since then she had not been feeling well. She admits to pain in the right lower portion of her abdomen around the right adnexal region primarily with radiation of the right lateral abdomen. Admits to some clear vaginal discharge but denies any vaginal purulent drainage or abnormal bleeding. Denies any dysuria or change in urinary frequency. Admits to fevers as high as nearly 103 °F.  Admits to chills. Admits to malaise and fatigue. Admits to anorexia. Headache, neck pain or stiffness or any acute neurological complaints. No chest pain, shortness of breath, cough or upper respiratory complaints. Denies hemoptysis.   Abdominal complaints as described without flank pain reported although the right lateral abdominal discomfort does approach to flank. LABS[de-identified]  Lab Results   Component Value Date    WBC 11.9 (H) 2020    HGB 7.8 (L) 2020    HCT 24.3 (L) 2020     2020     2020    K 4.7 2020     (H) 2020    CREATININE 0.6 2020    BUN 7 2020    CO2 18 (L) 2020    GLUCOSE 84 2020    ALT 12 2020    AST 20 2020    INR 1.3 2020     Lab Results   Component Value Date    INR 1.3 2020    PROTIME 14.6 (H) 2020      Lab Results   Component Value Date    TSH 1.730 2015     No results found for: TRIG  No results found for: HDL  No results found for: LDLCALC  No results found for: LABA1C    IMAGING:  Xr Wrist Left (min 3 Views)    Result Date: 2020  EXAMINATION: 4 XRAY VIEWS OF THE LEFT WRIST 2020 3:14 pm COMPARISON: None. HISTORY: ORDERING SYSTEM PROVIDED HISTORY: pain TECHNOLOGIST PROVIDED HISTORY: Reason for exam:->pain FINDINGS: No fracture or dislocation. The joint spaces are preserved. The carpal bones are normally aligned. .. The soft tissues are unremarkable. No acute abnormality of the wrist.     Us Non Ob Transvaginal    Result Date: 2020  EXAMINATION: PELVIC ULTRASOUND 2020 TECHNIQUE: Transvaginal pelvic ultrasound was performed. COMPARISON: None HISTORY: ORDERING SYSTEM PROVIDED HISTORY: sepsis, eval for retained products following elective  TECHNOLOGIST PROVIDED HISTORY: Reason for exam:->sepsis, eval for retained products following elective  FINDINGS: Measurements: Uterus: The uterus was enlarged measuring 11.7 x 7.4 x 8.0 cm Endometrial stripe: Normal in thickness measuring 8.8 mm. Right Ovary: Normal in size measuring 4.2 x 2.4 x 2.2 cm with normal appearing follicles. Left Ovary: Normal in size measuring 3.2 x 2.7 x 2.5 cm with normal appearing follicles. Ultrasound Findings: Uterus: Uterus demonstrates normal myometrial echotexture.  I do not see any retained products of conception. Endometrial stripe: Normal in thickness measuring 8.8 mm. Right Ovary: No right ovarian mass or cyst was noted. There is normal arterial and venous doppler flow. Left Ovary: No left ovarian mass or cyst was identified. Normal arterial and venous blood flow was noted. Free Fluid: No free fluid was identified in the cul-de-sac. I do not see any retained products of conception. No endometrial thickening. It measured 8.8 mm. No ovarian mass or cyst. No free fluid in the cul-de-sac. Ct Abdomen Pelvis W Iv Contrast Additional Contrast? Oral    Result Date: 8/3/2020  EXAMINATION: CT OF THE ABDOMEN AND PELVIS WITH CONTRAST 8/3/2020 4:37 pm TECHNIQUE: CT of the abdomen and pelvis was performed with the administration of intravenous contrast. Multiplanar reformatted images are provided for review. Dose modulation, iterative reconstruction, and/or weight based adjustment of the mA/kV was utilized to reduce the radiation dose to as low as reasonably achievable. COMPARISON: Correlation is made with the patient's previous CTA of the chest obtained 1 day earlier. HISTORY: ORDERING SYSTEM PROVIDED HISTORY: Strep pyogenes bacteremia after recent elective 15-week gestational , concern for intra-abdominal abscess TECHNOLOGIST PROVIDED HISTORY: Additional Contrast?->Oral Reason for exam:->Strep pyogenes bacteremia after recent elective 15-week gestational , concern for intra-abdominal abscess FINDINGS: Lower Chest: Bibasilar infiltration is noted more prominent on the left. While these findings can represent atelectasis I would favor the left lung base airspace disease to represent pneumonia. Organs:  Liver enhances normally without evidence of intrahepatic biliary ductal dilatation. The spleen, pancreas and adrenal glands are unremarkable. The kidneys enhance symmetrically without evidence of hydronephrosis. GI/Bowel: There are multiple stones seen within the gallbladder lumen.   There is no evidence of acute cholecystitis. The stomach is normally distended. There is no evidence of bowel obstruction. No evidence of abnormal bowel wall thickening or distension. Pelvis: The uterus is prominent consistent with early pregnancy. According to the patient's history the patient underwent an elective . No intrauterine gestation is noted. Peritoneum/Retroperitoneum:  No evidence of retroperitoneal lymphadenopathy. The abdominal aorta is normal caliber. Bones/Soft Tissues:  No acute abnormality of the visualized osseous structures. Left lung base consolidation much more prominent when compared with the patient's CT scan of the chest of 1 day earlier. Pneumonia is favored. Minimal consolidation within the right lung base favored to represent atelectasis. There is no intra-abdominal or intrapelvic abscess, free air or inflammatory process. Prominent uterus consistent with recent . Cholelithiasis. There is no evidence of acute cholecystitis. Ct Wrist Left Wo Contrast    Result Date: 8/3/2020  EXAMINATION: CT OF THE LEFT WRIST WITHOUT CONTRAST 8/3/2020 5:37 pm TECHNIQUE: CT of the left wrist was performed without the administration of intravenous contrast.  Multiplanar reformatted images are provided for review. Dose modulation, iterative reconstruction, and/or weight based adjustment of the mA/kV was utilized to reduce the radiation dose to as low as reasonably achievable. COMPARISON: None. HISTORY ORDERING SYSTEM PROVIDED HISTORY: Atraumatic left wrist pain with point tenderness over the scaphoid and anatomical snuffbox. Present bacteremia, concern for septic arthritis versus occult scaphoid fracture TECHNOLOGIST PROVIDED HISTORY: Reason for exam:->Atraumatic left wrist pain with point tenderness over the scaphoid and anatomical snuffbox. Present bacteremia, concern for septic arthritis versus occult scaphoid fracture FINDINGS: No acute fracture or dislocation. No erosive changes.  No significant joint space narrowing. Mild edema noted about the wrist.  No significant joint effusion. No acute fracture. Mild edema about the wrist without significant joint effusion. No erosive changes. Xr Chest Portable    Result Date: 8/2/2020  EXAMINATION: ONE XRAY VIEW OF THE CHEST 8/2/2020 3:16 pm COMPARISON: None. HISTORY: ORDERING SYSTEM PROVIDED HISTORY: Possible sepsis, fever TECHNOLOGIST PROVIDED HISTORY: Reason for exam:->Possible sepsis, fever FINDINGS: Heart size is unable to be accurately assessed on this single portable view of the chest.  There are mild hazy airspace opacities throughout both lungs. No evidence of a pneumothorax or sizable pleural effusion. No acute osseous abnormality. Hazy airspace opacities throughout both lungs. These may be on the basis of an infectious or inflammatory pneumonitis. Cta Chest W Contrast    Result Date: 8/2/2020  EXAMINATION: CTA OF THE CHEST 8/2/2020 5:20 pm TECHNIQUE: CTA of the chest was performed after the administration of intravenous contrast.  Multiplanar reformatted images are provided for review. MIP images are provided for review. Dose modulation, iterative reconstruction, and/or weight based adjustment of the mA/kV was utilized to reduce the radiation dose to as low as reasonably achievable. COMPARISON: None. HISTORY: ORDERING SYSTEM PROVIDED HISTORY: post op sepsis, question amniotic PE, TECHNOLOGIST PROVIDED HISTORY: Reason for exam:->post op sepsis, question amniotic PE, FINDINGS: Pulmonary Arteries: Pulmonary arteries are adequately opacified for evaluation. No evidence of intraluminal filling defect to suggest pulmonary embolism. Main pulmonary artery is normal in caliber. Mediastinum: No hilar or mediastinal lymphadenopathy were noted. Lungs/pleura: Discoid atelectasis was noted in the left lower lobe. Upper Abdomen: No focal hepatic mass or dilated hepatic biliary ducts were noted.  Soft Tissues/Bones: No acute bone or soft tissue abnormality. No pulmonary emboli. No hilar or mediastinal mass was noted. No hilar or mediastinal lymphadenopathy were identified. Discoid atelectasis in the left lower lobe. HOSPITAL COURSE:   Mihai Mcclendon was found to be suffering from Streptococcus bacteremia in the setting of recent elective . She underwent transesophageal echocardiogram with no evidence of vegetation. She was evaluated by multiple subspecialist throughout the hospitalization including cardiology, infectious disease, OB/GYN, and orthopedics. PICC line was placed and she has been placed on IV Rocephin therapy which will be continued for 2 additional weeks. Home health care has also been arranged. Lab work and vital signs stabilized otherwise. She is acceptable for discharge home. BRIEF PHYSICAL EXAMINATION AND LABORATORIES ON DAY OF DISCHARGE:  VITALS:  /71   Pulse 88   Temp 98.4 °F (36.9 °C) (Temporal)   Resp 16   Ht 5' 1\" (1.549 m)   Wt 110 lb (49.9 kg)   SpO2 94% Comment: ROOM AIR  BMI 20.78 kg/m²     HEENT:  PERRLA. EOMI. Sclera clear. Buccal mucosa moist.    Neck:  Supple. Trachea midline. No thyromegaly. No JVD. No bruits. Heart:  Rhythm regular, rate controlled. No murmurs. Lungs:  Symmetrical. Clear to auscultation bilaterally. No wheezes. No rhonchi. No rales. Abdomen: Soft. Non-tender. Non-distended. Bowel sounds positive. No organomegaly or masses. No pain on palpation    Extremities:  Peripheral pulses present. No peripheral edema. No ulcers. Neurologic:  Alert x 3. No focal deficit. Cranial nerves grossly intact. Skin:  No petechia. No hemorrhage. No wounds. DISPOSITION:  The patient's condition is good. At this time the patient is without objective evidence of an acute process requiring continuing hospitalization or inpatient management. They are stable for discharge with outpatient follow-up.     I have spoken with the patient and discussed the results of the current hospitalization, in addition to providing specific details for the plan of care and counseling regarding the diagnosis and prognosis. The plan has been discussed in detail and they are aware of the specific conditions for emergent return, as well as the importance of follow-up. Their questions are answered at this time and they are agreeable with the plan for discharge to home    DISCHARGE MEDICATIONS:    Danella Boots   Home Medication Instructions [facility]:[id number]    Printed on:08/06/20 3871   Medication Information                      benzonatate (TESSALON) 100 MG capsule  Take 1 capsule by mouth 3 times daily as needed for Cough             cefTRIAXone (ROCEPHIN) infusion  Infuse 2,000 mg intravenously every 24 hours for 14 days Compound per protocol             clotrimazole (MYCELEX) 10 MG bruna  Take 1 tablet by mouth 5 times daily for 10 days             ibuprofen (ADVIL;MOTRIN) 800 MG tablet  Take 1 tablet by mouth every 6 hours as needed for Pain             Prenatal Vit-Fe Fumarate-FA (PRENATAL 1+1 PO)  Take 1 tablet by mouth                 FOLLOW UP/INSTRUCTIONS:  · This patient is instructed to follow-up with her primary care physician. · Patient is instructed to follow-up with the consults listed above as directed by them. · she is instructed to resume home medications and take new medications as indicated in the list above. · If the patient has a recurrence of symptoms, she is instructed to go to the ED. Preparing for this patient's discharge, including paperwork, orders, instructions, and meeting with patient did require > 40 minutes.     Autumn Ambrose DO   8/6/2020  11:34 AM

## 2020-08-07 ENCOUNTER — TELEPHONE (OUTPATIENT)
Dept: ADMINISTRATIVE | Age: 25
End: 2020-08-07

## 2020-08-07 ENCOUNTER — TELEPHONE (OUTPATIENT)
Dept: PRIMARY CARE CLINIC | Age: 25
End: 2020-08-07

## 2020-08-07 NOTE — TELEPHONE ENCOUNTER
[facility] Initial Follow Up Call    Outreach made within 2 business days of discharge: Yes    Patient: Juan R Sargent Patient : 1995   MRN: <[id number]>  Reason for Admission: There are no discharge diagnoses documented for the most recent discharge. Discharge Date: 20       Spoke with: I was unable to reach the patient on my first attempt,but naty  try again in a few hours. Spoke to Juan R Sargent @1:57 pm  Discharge department/facility: [address]    TCM Interactive Patient Contact:  Was patient able to fill all prescriptions: Yes  Was patient instructed to bring all medications to the follow-up visit: Yes  Is patient taking all medications as directed in the discharge summary? Yes  Does patient understand their discharge instructions: Yes  Does patient have questions or concerns that need addressed prior to 7-14 day follow up office visit: no    Scheduled appointment with PCP within 7-14 days  Hosp f/up TCM Appt with PCP on 20 @ 11:30 am  Follow Up  No future appointments.     Truman Oden MA

## 2020-08-07 NOTE — TELEPHONE ENCOUNTER
Pt called wanting to schedule an appt but there aren't any past visits  for this pt on chart . Pt stated she is a former pt of . Please advise to schedule NP appt .  Pt was d/c 08/06 from [facility]

## 2020-08-08 NOTE — TELEPHONE ENCOUNTER
This patient can be scheduled with me as a new patient. She was just released from hospital with Sepsis, so ok to use a double freeze-thraw for 30 minute appointment this coming week. Thank you.

## 2020-08-09 LAB
BLOOD CULTURE, ROUTINE: NORMAL
CULTURE, BLOOD 2: NORMAL

## 2020-08-10 ENCOUNTER — HOSPITAL ENCOUNTER (OUTPATIENT)
Age: 25
Discharge: HOME | End: 2020-08-12
Payer: COMMERCIAL

## 2020-08-10 LAB
ALBUMIN SERPL-MCNC: 3 G/DL (ref 3.5–5.2)
ALP BLD-CCNC: 66 U/L (ref 35–104)
ALT SERPL-CCNC: 14 U/L (ref 0–32)
ANION GAP SERPL CALCULATED.3IONS-SCNC: 12 MMOL/L (ref 7–16)
AST SERPL-CCNC: 16 U/L (ref 0–31)
BASOPHILS ABSOLUTE: 0.04 E9/L (ref 0–0.2)
BASOPHILS RELATIVE PERCENT: 0.7 % (ref 0–2)
BILIRUB SERPL-MCNC: 0.3 MG/DL (ref 0–1.2)
BUN BLDV-MCNC: 2 MG/DL (ref 6–20)
C-REACTIVE PROTEIN: 2.5 MG/DL (ref 0–0.4)
CALCIUM SERPL-MCNC: 8.5 MG/DL (ref 8.6–10.2)
CHLORIDE BLD-SCNC: 108 MMOL/L (ref 98–107)
CO2: 21 MMOL/L (ref 22–29)
CREAT SERPL-MCNC: 0.6 MG/DL (ref 0.5–1)
EOSINOPHILS ABSOLUTE: 0.18 E9/L (ref 0.05–0.5)
EOSINOPHILS RELATIVE PERCENT: 2.9 % (ref 0–6)
GFR AFRICAN AMERICAN: >60
GFR NON-AFRICAN AMERICAN: >60 ML/MIN/1.73
GLUCOSE BLD-MCNC: 87 MG/DL (ref 74–99)
HCT VFR BLD CALC: 25.7 % (ref 34–48)
HEMOGLOBIN: 8 G/DL (ref 11.5–15.5)
IMMATURE GRANULOCYTES #: 0.11 E9/L
IMMATURE GRANULOCYTES %: 1.8 % (ref 0–5)
LYMPHOCYTES ABSOLUTE: 2.19 E9/L (ref 1.5–4)
LYMPHOCYTES RELATIVE PERCENT: 35.7 % (ref 20–42)
MCH RBC QN AUTO: 29.2 PG (ref 26–35)
MCHC RBC AUTO-ENTMCNC: 31.1 % (ref 32–34.5)
MCV RBC AUTO: 93.8 FL (ref 80–99.9)
MONOCYTES ABSOLUTE: 0.42 E9/L (ref 0.1–0.95)
MONOCYTES RELATIVE PERCENT: 6.8 % (ref 2–12)
NEUTROPHILS ABSOLUTE: 3.2 E9/L (ref 1.8–7.3)
NEUTROPHILS RELATIVE PERCENT: 52.1 % (ref 43–80)
PDW BLD-RTO: 17 FL (ref 11.5–15)
PLATELET # BLD: 447 E9/L (ref 130–450)
PMV BLD AUTO: 11.1 FL (ref 7–12)
POTASSIUM SERPL-SCNC: 3.9 MMOL/L (ref 3.5–5)
RBC # BLD: 2.74 E12/L (ref 3.5–5.5)
SODIUM BLD-SCNC: 141 MMOL/L (ref 132–146)
TOTAL PROTEIN: 6.4 G/DL (ref 6.4–8.3)
WBC # BLD: 6.1 E9/L (ref 4.5–11.5)

## 2020-08-10 PROCEDURE — 86140 C-REACTIVE PROTEIN: CPT

## 2020-08-10 PROCEDURE — 80053 COMPREHEN METABOLIC PANEL: CPT

## 2020-08-10 PROCEDURE — 85025 COMPLETE CBC W/AUTO DIFF WBC: CPT

## 2020-08-10 PROCEDURE — 36415 COLL VENOUS BLD VENIPUNCTURE: CPT

## 2020-08-10 PROCEDURE — 85651 RBC SED RATE NONAUTOMATED: CPT

## 2020-08-11 LAB — SEDIMENTATION RATE, ERYTHROCYTE: 50 MM/HR (ref 0–20)

## 2020-08-14 ENCOUNTER — HOSPITAL ENCOUNTER (OUTPATIENT)
Age: 25
Discharge: HOME | End: 2020-08-16
Payer: COMMERCIAL

## 2020-08-14 LAB
ALBUMIN SERPL-MCNC: 3.4 G/DL (ref 3.5–5.2)
ALP BLD-CCNC: 63 U/L (ref 35–104)
ALT SERPL-CCNC: 10 U/L (ref 0–32)
ANION GAP SERPL CALCULATED.3IONS-SCNC: 13 MMOL/L (ref 7–16)
AST SERPL-CCNC: 18 U/L (ref 0–31)
BASOPHILS ABSOLUTE: 0.09 E9/L (ref 0–0.2)
BASOPHILS RELATIVE PERCENT: 2 % (ref 0–2)
BILIRUB SERPL-MCNC: 0.3 MG/DL (ref 0–1.2)
BUN BLDV-MCNC: 5 MG/DL (ref 6–20)
CALCIUM SERPL-MCNC: 9.2 MG/DL (ref 8.6–10.2)
CHLORIDE BLD-SCNC: 105 MMOL/L (ref 98–107)
CO2: 24 MMOL/L (ref 22–29)
CREAT SERPL-MCNC: 0.5 MG/DL (ref 0.5–1)
EOSINOPHILS ABSOLUTE: 0.2 E9/L (ref 0.05–0.5)
EOSINOPHILS RELATIVE PERCENT: 4.4 % (ref 0–6)
GFR AFRICAN AMERICAN: >60
GFR NON-AFRICAN AMERICAN: >60 ML/MIN/1.73
GLUCOSE BLD-MCNC: 83 MG/DL (ref 74–99)
HCT VFR BLD CALC: 28.2 % (ref 34–48)
HEMOGLOBIN: 8.7 G/DL (ref 11.5–15.5)
IMMATURE GRANULOCYTES #: 0.01 E9/L
IMMATURE GRANULOCYTES %: 0.2 % (ref 0–5)
LYMPHOCYTES ABSOLUTE: 2.24 E9/L (ref 1.5–4)
LYMPHOCYTES RELATIVE PERCENT: 49.3 % (ref 20–42)
MCH RBC QN AUTO: 29.2 PG (ref 26–35)
MCHC RBC AUTO-ENTMCNC: 30.9 % (ref 32–34.5)
MCV RBC AUTO: 94.6 FL (ref 80–99.9)
MONOCYTES ABSOLUTE: 0.31 E9/L (ref 0.1–0.95)
MONOCYTES RELATIVE PERCENT: 6.8 % (ref 2–12)
NEUTROPHILS ABSOLUTE: 1.69 E9/L (ref 1.8–7.3)
NEUTROPHILS RELATIVE PERCENT: 37.3 % (ref 43–80)
PDW BLD-RTO: 15.8 FL (ref 11.5–15)
PLATELET # BLD: 513 E9/L (ref 130–450)
PMV BLD AUTO: 10.1 FL (ref 7–12)
POTASSIUM SERPL-SCNC: 3.8 MMOL/L (ref 3.5–5)
RBC # BLD: 2.98 E12/L (ref 3.5–5.5)
SODIUM BLD-SCNC: 142 MMOL/L (ref 132–146)
TOTAL PROTEIN: 7.2 G/DL (ref 6.4–8.3)
WBC # BLD: 4.5 E9/L (ref 4.5–11.5)

## 2020-08-14 PROCEDURE — 85025 COMPLETE CBC W/AUTO DIFF WBC: CPT

## 2020-08-14 PROCEDURE — 36415 COLL VENOUS BLD VENIPUNCTURE: CPT

## 2020-08-14 PROCEDURE — 80053 COMPREHEN METABOLIC PANEL: CPT

## 2020-08-17 ENCOUNTER — HOSPITAL ENCOUNTER (OUTPATIENT)
Age: 25
Discharge: HOME | End: 2020-08-19
Payer: COMMERCIAL

## 2020-08-17 ENCOUNTER — OFFICE VISIT (OUTPATIENT)
Dept: FAMILY MEDICINE CLINIC | Age: 25
End: 2020-08-17
Payer: COMMERCIAL

## 2020-08-17 VITALS
RESPIRATION RATE: 20 BRPM | WEIGHT: 117 LBS | BODY MASS INDEX: 22.09 KG/M2 | DIASTOLIC BLOOD PRESSURE: 74 MMHG | OXYGEN SATURATION: 100 % | HEART RATE: 63 BPM | HEIGHT: 61 IN | SYSTOLIC BLOOD PRESSURE: 124 MMHG

## 2020-08-17 LAB
ALBUMIN SERPL-MCNC: 3.6 G/DL (ref 3.5–5.2)
ALP BLD-CCNC: 64 U/L (ref 35–104)
ALT SERPL-CCNC: 11 U/L (ref 0–32)
ANION GAP SERPL CALCULATED.3IONS-SCNC: 16 MMOL/L (ref 7–16)
AST SERPL-CCNC: 17 U/L (ref 0–31)
BASOPHILS ABSOLUTE: 0.11 E9/L (ref 0–0.2)
BASOPHILS RELATIVE PERCENT: 2.7 % (ref 0–2)
BILIRUB SERPL-MCNC: 0.3 MG/DL (ref 0–1.2)
BUN BLDV-MCNC: 7 MG/DL (ref 6–20)
C-REACTIVE PROTEIN: 0.6 MG/DL (ref 0–0.4)
CALCIUM SERPL-MCNC: 9.1 MG/DL (ref 8.6–10.2)
CHLORIDE BLD-SCNC: 106 MMOL/L (ref 98–107)
CO2: 22 MMOL/L (ref 22–29)
CREAT SERPL-MCNC: 0.6 MG/DL (ref 0.5–1)
EOSINOPHILS ABSOLUTE: 0.28 E9/L (ref 0.05–0.5)
EOSINOPHILS RELATIVE PERCENT: 6.9 % (ref 0–6)
GFR AFRICAN AMERICAN: >60
GFR NON-AFRICAN AMERICAN: >60 ML/MIN/1.73
GLUCOSE BLD-MCNC: 70 MG/DL (ref 74–99)
HCT VFR BLD CALC: 29 % (ref 34–48)
HEMOGLOBIN: 8.8 G/DL (ref 11.5–15.5)
IMMATURE GRANULOCYTES #: 0.01 E9/L
IMMATURE GRANULOCYTES %: 0.2 % (ref 0–5)
LYMPHOCYTES ABSOLUTE: 2.04 E9/L (ref 1.5–4)
LYMPHOCYTES RELATIVE PERCENT: 50 % (ref 20–42)
MCH RBC QN AUTO: 28.6 PG (ref 26–35)
MCHC RBC AUTO-ENTMCNC: 30.3 % (ref 32–34.5)
MCV RBC AUTO: 94.2 FL (ref 80–99.9)
MONOCYTES ABSOLUTE: 0.31 E9/L (ref 0.1–0.95)
MONOCYTES RELATIVE PERCENT: 7.6 % (ref 2–12)
NEUTROPHILS ABSOLUTE: 1.33 E9/L (ref 1.8–7.3)
NEUTROPHILS RELATIVE PERCENT: 32.6 % (ref 43–80)
PDW BLD-RTO: 15.1 FL (ref 11.5–15)
PLATELET # BLD: 427 E9/L (ref 130–450)
PMV BLD AUTO: 10.3 FL (ref 7–12)
POTASSIUM SERPL-SCNC: 3.9 MMOL/L (ref 3.5–5)
RBC # BLD: 3.08 E12/L (ref 3.5–5.5)
SEDIMENTATION RATE, ERYTHROCYTE: 38 MM/HR (ref 0–20)
SODIUM BLD-SCNC: 144 MMOL/L (ref 132–146)
TOTAL PROTEIN: 7.4 G/DL (ref 6.4–8.3)
WBC # BLD: 4.1 E9/L (ref 4.5–11.5)

## 2020-08-17 PROCEDURE — 80053 COMPREHEN METABOLIC PANEL: CPT

## 2020-08-17 PROCEDURE — 99214 OFFICE O/P EST MOD 30 MIN: CPT | Performed by: FAMILY MEDICINE

## 2020-08-17 PROCEDURE — 85651 RBC SED RATE NONAUTOMATED: CPT

## 2020-08-17 PROCEDURE — 85025 COMPLETE CBC W/AUTO DIFF WBC: CPT

## 2020-08-17 PROCEDURE — 36415 COLL VENOUS BLD VENIPUNCTURE: CPT

## 2020-08-17 PROCEDURE — 1111F DSCHRG MED/CURRENT MED MERGE: CPT | Performed by: FAMILY MEDICINE

## 2020-08-17 PROCEDURE — 86140 C-REACTIVE PROTEIN: CPT

## 2020-08-17 ASSESSMENT — ENCOUNTER SYMPTOMS
NAUSEA: 0
DIARRHEA: 0
SHORTNESS OF BREATH: 0
VOMITING: 0

## 2020-08-17 ASSESSMENT — PATIENT HEALTH QUESTIONNAIRE - PHQ9
SUM OF ALL RESPONSES TO PHQ9 QUESTIONS 1 & 2: 0
SUM OF ALL RESPONSES TO PHQ QUESTIONS 1-9: 0
1. LITTLE INTEREST OR PLEASURE IN DOING THINGS: 0
SUM OF ALL RESPONSES TO PHQ QUESTIONS 1-9: 0
2. FEELING DOWN, DEPRESSED OR HOPELESS: 0

## 2020-08-17 NOTE — PATIENT INSTRUCTIONS
intensive care unit (ICU), the ICU staff will do everything they can to treat all of the problems sepsis causes, including the infection. The ICU can be scary and confusing for patients and their families, friends, and supporters. But it's designed to keep your loved one comfortable and safe and to provide the best medical care. Expect a long recovery after the person leaves the ICU. If you need it, ask for support from friends and family. Where can you learn more? Go to [email or web address]://[email or web address]. org and sign in to your [employer] account. Enter O300 in the [employer] box to learn more about \"Learning About Sepsis. \"     If you do not have an account, please click on the \"Sign Up Now\" link. Current as of: June 26, 2019               Content Version: 12.5  © 8620-6349 [employer], Incorporated. Care instructions adapted under license by [facility] ([facility]). If you have questions about a medical condition or this instruction, always ask your healthcare professional. [address] any warranty or liability for your use of this information.

## 2020-08-17 NOTE — PROGRESS NOTES
Post-Discharge Transitional Care Management Services or Hospital Follow Up      Rodrigue Pruitt   YOB: 1995    Date of Office Visit:  2020  Date of Hospital Admission: 20  Date of Hospital Discharge: 20  Readmission Risk Score(high >=14%. Medium >=10%):Readmission Risk Score: 7      Care management risk score Rising risk (score 2-5) and Complex Care (Scores >=6): 0     Non face to face  following discharge, date last encounter closed (first attempt may have been earlier): 2020  2:03 PM 2020  2:03 PM    Call initiated 2 business days of discharge: Yes     Patient Active Problem List   Diagnosis    Condyloma acuminatum    Iron deficiency anemia    Intrauterine growth restriction affecting care of mother, antepartum, third trimester, not applicable or unspecified fetus    44 weeks gestation of pregnancy     (spontaneous vaginal delivery)    FTND (full term normal delivery)    Sepsis ([address]       No Known Allergies    Medications listed as ordered at the time of discharge from [facility] [place] Medication Instructions RICHA:    Printed on:20 0939   Medication Information                      ibuprofen (ADVIL;MOTRIN) 800 MG tablet  Take 1 tablet by mouth every 6 hours as needed for Pain                   Medications marked \"taking\" at this time  Outpatient Medications Marked as Taking for the 20 encounter (Office Visit) with Nickie White MD   Medication Sig Dispense Refill    [] cefTRIAXone (ROCEPHIN) infusion Infuse 2,000 mg intravenously every 24 hours for 14 days Compound per protocol 28 g 0    ibuprofen (ADVIL;MOTRIN) 800 MG tablet Take 1 tablet by mouth every 6 hours as needed for Pain 120 tablet 1        Medications patient taking as of now reconciled against medications ordered at time of hospital discharge: Yes    Chief Complaint   Patient presents with   [facility] Care Management     pt states on 2 other meds no name ? Patient was admitted to [address] [place] [address],[address] for Sepsis. Had elective  5 days prior to admission to hospital. Became septic and febrile. Patient had associated right flank and joint pain. Also had neck stiffness. Patient tried otc pain medication which did not help, so went to ED. Was admitted. Inpatient course: Discharge summary reviewed- see chart. Interval history/Current status: Patient has been home from hospital for past 10 days. Patient denies fever/chills. Tolerating intravenous Ceftriaxone--takes it once daily. Almost finished with treatment. Denies chest pain or shortness of breath. Had initial diarrhea but has resolved. Denies nausea. Review of Systems   Eyes: Negative for visual disturbance. Respiratory: Negative for shortness of breath. Cardiovascular: Negative for chest pain, palpitations and leg swelling. Gastrointestinal: Negative for diarrhea, nausea and vomiting. Genitourinary: Negative for difficulty urinating, dysuria, frequency and vaginal discharge. Skin: Negative for rash. Vitals:    20 1643   BP: 124/74   Pulse: 63   Resp: 20   SpO2: 100%   Weight: 117 lb (53.1 kg)   Height: 5' 1\" (1.549 m)     Body mass index is 22.11 kg/m². Wt Readings from Last 3 Encounters:   20 116 lb (52.6 kg)   20 117 lb (53.1 kg)   20 110 lb (49.9 kg)     BP Readings from Last 3 Encounters:   20 120/82   20 124/74   20 110/71       Physical Exam  Vitals signs reviewed. Constitutional:       Appearance: She is well-developed. Cardiovascular:      Rate and Rhythm: Normal rate and regular rhythm. Heart sounds: Normal heart sounds. No murmur. No friction rub. Pulmonary:      Effort: Pulmonary effort is normal. No respiratory distress. Breath sounds: Normal breath sounds. No wheezing or rales. Abdominal:      General: Bowel sounds are normal. There is no distension. Palpations: Abdomen is soft. Tenderness:  There is no abdominal tenderness. There is no guarding or rebound. Skin:     General: Skin is warm and dry. Comments: +visible PICC line and bandage left upper arm   Neurological:      Mental Status: She is alert and oriented to person, place, and time. Assessment/Plan:  1.  Bacteremia due to group B Streptococcus  - [place] DISCHARGE MEDS RECONCILED W/ CURRENT OUTPATIENT MED LIST  - complete intravenous antibiotics      Medical Decision Making: moderate complexity

## 2020-08-20 ENCOUNTER — HOSPITAL ENCOUNTER (OUTPATIENT)
Age: 25
Discharge: HOME | End: 2020-08-22
Payer: COMMERCIAL

## 2020-08-20 LAB
ALBUMIN SERPL-MCNC: 3.9 G/DL (ref 3.5–5.2)
ALP BLD-CCNC: 60 U/L (ref 35–104)
ALT SERPL-CCNC: 10 U/L (ref 0–32)
ANION GAP SERPL CALCULATED.3IONS-SCNC: 20 MMOL/L (ref 7–16)
AST SERPL-CCNC: 21 U/L (ref 0–31)
BASOPHILS ABSOLUTE: 0.07 E9/L (ref 0–0.2)
BASOPHILS RELATIVE PERCENT: 1.7 % (ref 0–2)
BILIRUB SERPL-MCNC: 0.3 MG/DL (ref 0–1.2)
BUN BLDV-MCNC: 5 MG/DL (ref 6–20)
CALCIUM SERPL-MCNC: 9.5 MG/DL (ref 8.6–10.2)
CHLORIDE BLD-SCNC: 106 MMOL/L (ref 98–107)
CO2: 20 MMOL/L (ref 22–29)
CREAT SERPL-MCNC: 0.5 MG/DL (ref 0.5–1)
EOSINOPHILS ABSOLUTE: 0.25 E9/L (ref 0.05–0.5)
EOSINOPHILS RELATIVE PERCENT: 6.2 % (ref 0–6)
GFR AFRICAN AMERICAN: >60
GFR NON-AFRICAN AMERICAN: >60 ML/MIN/1.73
GLUCOSE BLD-MCNC: 89 MG/DL (ref 74–99)
HCT VFR BLD CALC: 29.2 % (ref 34–48)
HEMOGLOBIN: 9.2 G/DL (ref 11.5–15.5)
IMMATURE GRANULOCYTES #: 0.01 E9/L
IMMATURE GRANULOCYTES %: 0.2 % (ref 0–5)
LYMPHOCYTES ABSOLUTE: 2.19 E9/L (ref 1.5–4)
LYMPHOCYTES RELATIVE PERCENT: 54.2 % (ref 20–42)
MCH RBC QN AUTO: 29.2 PG (ref 26–35)
MCHC RBC AUTO-ENTMCNC: 31.5 % (ref 32–34.5)
MCV RBC AUTO: 92.7 FL (ref 80–99.9)
MONOCYTES ABSOLUTE: 0.31 E9/L (ref 0.1–0.95)
MONOCYTES RELATIVE PERCENT: 7.7 % (ref 2–12)
NEUTROPHILS ABSOLUTE: 1.21 E9/L (ref 1.8–7.3)
NEUTROPHILS RELATIVE PERCENT: 30 % (ref 43–80)
PDW BLD-RTO: 14.6 FL (ref 11.5–15)
PLATELET # BLD: 354 E9/L (ref 130–450)
PMV BLD AUTO: 10.6 FL (ref 7–12)
POTASSIUM SERPL-SCNC: 3.6 MMOL/L (ref 3.5–5)
RBC # BLD: 3.15 E12/L (ref 3.5–5.5)
SODIUM BLD-SCNC: 146 MMOL/L (ref 132–146)
TOTAL PROTEIN: 7.7 G/DL (ref 6.4–8.3)
WBC # BLD: 4 E9/L (ref 4.5–11.5)

## 2020-08-20 PROCEDURE — 85025 COMPLETE CBC W/AUTO DIFF WBC: CPT

## 2020-08-20 PROCEDURE — 80053 COMPREHEN METABOLIC PANEL: CPT

## 2020-08-20 PROCEDURE — 36415 COLL VENOUS BLD VENIPUNCTURE: CPT

## 2020-08-24 ENCOUNTER — TELEPHONE (OUTPATIENT)
Dept: FAMILY MEDICINE CLINIC | Age: 25
End: 2020-08-24

## 2020-08-24 NOTE — TELEPHONE ENCOUNTER
MICHELLE [address], called to advise patient d'chgd from home care. PICC Line was removed by Dr. Dorcas Walton.

## 2020-09-04 ENCOUNTER — TELEPHONE (OUTPATIENT)
Dept: FAMILY MEDICINE CLINIC | Age: 25
End: 2020-09-04

## 2020-09-04 NOTE — TELEPHONE ENCOUNTER
Pt will be starting a  job and needs a physical form completed. Will she need to be seen. Last office visit 8.17.20.

## 2020-09-17 ENCOUNTER — TELEPHONE (OUTPATIENT)
Dept: FAMILY MEDICINE CLINIC | Age: 25
End: 2020-09-17

## 2020-09-17 ENCOUNTER — OFFICE VISIT (OUTPATIENT)
Dept: FAMILY MEDICINE CLINIC | Age: 25
End: 2020-09-17
Payer: COMMERCIAL

## 2020-09-17 VITALS
DIASTOLIC BLOOD PRESSURE: 70 MMHG | BODY MASS INDEX: 22.84 KG/M2 | RESPIRATION RATE: 20 BRPM | OXYGEN SATURATION: 98 % | SYSTOLIC BLOOD PRESSURE: 104 MMHG | HEART RATE: 67 BPM | WEIGHT: 121 LBS | HEIGHT: 61 IN

## 2020-09-17 PROBLEM — O36.5930: Status: RESOLVED | Noted: 2019-01-04 | Resolved: 2020-09-17

## 2020-09-17 PROBLEM — A41.9 SEPSIS (HCC): Status: RESOLVED | Noted: 2020-08-02 | Resolved: 2020-09-17

## 2020-09-17 PROCEDURE — G8420 CALC BMI NORM PARAMETERS: HCPCS | Performed by: FAMILY MEDICINE

## 2020-09-17 PROCEDURE — 90715 TDAP VACCINE 7 YRS/> IM: CPT | Performed by: FAMILY MEDICINE

## 2020-09-17 PROCEDURE — 4004F PT TOBACCO SCREEN RCVD TLK: CPT | Performed by: FAMILY MEDICINE

## 2020-09-17 PROCEDURE — G8427 DOCREV CUR MEDS BY ELIG CLIN: HCPCS | Performed by: FAMILY MEDICINE

## 2020-09-17 PROCEDURE — 99213 OFFICE O/P EST LOW 20 MIN: CPT | Performed by: FAMILY MEDICINE

## 2020-09-17 PROCEDURE — 90471 IMMUNIZATION ADMIN: CPT | Performed by: FAMILY MEDICINE

## 2020-09-17 ASSESSMENT — ENCOUNTER SYMPTOMS
NAUSEA: 0
VOMITING: 0
DIARRHEA: 0
SHORTNESS OF BREATH: 0

## 2020-09-17 NOTE — PROGRESS NOTES
[address] [facility], [address]   [address] [facility]   Moody Conklin MD     Patient: Robert Dee Birth: 1995  Visit Date: 9/17/20    Marlin Nails is a 31y.o. year old female here today for   Chief Complaint   Patient presents with    Employment Physical       HPI  Patient here for follow up from bacteremia. Denies recurrent fever or chills. Patient due for employment physical. Due for Tdap. Works at a . Review of Systems   Constitutional: Negative for chills and fever. Eyes: Negative for visual disturbance. Respiratory: Negative for shortness of breath. Cardiovascular: Negative for chest pain, palpitations and leg swelling. Gastrointestinal: Negative for diarrhea, nausea and vomiting. Genitourinary: Negative for difficulty urinating, dysuria and frequency. Skin: Negative for rash. Past medical, surgical, social and/or family historyreviewed, updated as needed, and are non-contributory (unless otherwise stated). Medications, allergies, and problem list also reviewed and updated as needed in patient's record. Current Outpatient Medications   Medication Sig Dispense Refill    ibuprofen (ADVIL;MOTRIN) 800 MG tablet Take 1 tablet by mouth every 6 hours as needed for Pain 120 tablet 1     No current facility-administered medications for this visit. Wt Readings from Last 3 Encounters:   09/17/20 121 lb (54.9 kg)   08/21/20 116 lb (52.6 kg)   08/17/20 117 lb (53.1 kg)                   Vitals:    09/17/20 1037   BP: 104/70   Pulse: 67   Resp: 20   SpO2: 98%        Physical Exam  Vitals signs reviewed. Constitutional:       Appearance: She is well-developed. Cardiovascular:      Rate and Rhythm: Normal rate and regular rhythm. Heart sounds: Normal heart sounds. No murmur. No friction rub. Pulmonary:      Effort: Pulmonary effort is normal. No respiratory distress.       Breath sounds: Normal breath sounds. No wheezing or rales. Abdominal:      General: Bowel sounds are normal. There is no distension. Palpations: Abdomen is soft. Tenderness: There is no abdominal tenderness. There is no guarding or rebound. Skin:     General: Skin is warm and dry. Neurological:      Mental Status: She is alert and oriented to person, place, and time.        Results for orders placed or performed during the hospital encounter of 08/20/20   Comprehensive Metabolic Panel   Result Value Ref Range    Sodium 146 132 - 146 mmol/L    Potassium 3.6 3.5 - 5.0 mmol/L    Chloride 106 98 - 107 mmol/L    CO2 20 (L) 22 - 29 mmol/L    Anion Gap 20 (H) 7 - 16 mmol/L    Glucose 89 74 - 99 mg/dL    BUN 5 (L) 6 - 20 mg/dL    CREATININE 0.5 0.5 - 1.0 mg/dL    GFR Non-African American >60 >=60 mL/min/1.73    GFR African American >60     Calcium 9.5 8.6 - 10.2 mg/dL    Total Protein 7.7 6.4 - 8.3 g/dL    Alb 3.9 3.5 - 5.2 g/dL    Total Bilirubin 0.3 0.0 - 1.2 mg/dL    Alkaline Phosphatase 60 35 - 104 U/L    ALT 10 0 - 32 U/L    AST 21 0 - 31 U/L   CBC Auto Differential   Result Value Ref Range    WBC 4.0 (L) 4.5 - 11.5 E9/L    RBC 3.15 (L) 3.50 - 5.50 E12/L    Hemoglobin 9.2 (L) 11.5 - 15.5 g/dL    Hematocrit 29.2 (L) 34.0 - 48.0 %    MCV 92.7 80.0 - 99.9 fL    MCH 29.2 26.0 - 35.0 pg    MCHC 31.5 (L) 32.0 - 34.5 %    RDW 14.6 11.5 - 15.0 fL    Platelets 454 943 - 808 E9/L    MPV 10.6 7.0 - 12.0 fL    Neutrophils % 30.0 (L) 43.0 - 80.0 %    Immature Granulocytes % 0.2 0.0 - 5.0 %    Lymphocytes % 54.2 (H) 20.0 - 42.0 %    Monocytes % 7.7 2.0 - 12.0 %    Eosinophils % 6.2 (H) 0.0 - 6.0 %    Basophils % 1.7 0.0 - 2.0 %    Neutrophils Absolute 1.21 (L) 1.80 - 7.30 E9/L    Immature Granulocytes # 0.01 E9/L    Lymphocytes Absolute 2.19 1.50 - 4.00 E9/L    Monocytes Absolute 0.31 0.10 - 0.95 E9/L    Eosinophils Absolute 0.25 0.05 - 0.50 E9/L    Basophils Absolute 0.07 0.00 - 0.20 E9/L       ASSESSMENT/PLAN  Lauren was seen today for employment physical.    Diagnoses and all orders for this visit:    Encounter for physical examination related to employment       -     Tdap today; forms completed and will be faxed    Bacteremia due to group B Streptococcus         -    Resolved; clinically improved    Need for prophylactic vaccination with combined diphtheria-tetanus-pertussis (DTP) vaccine  -     Tdap (age 6y and older)  ([facility]          Phone/MyChart follow up if tests abnormal.    Return if symptoms worsen or fail to improve. or sooner if necessary. I have reviewed my findings and recommendations with Lauren.      BREONNA CamiloD

## 2020-09-17 NOTE — PATIENT INSTRUCTIONS
Patient Education        Tdap (Tetanus, Diphtheria, Pertussis) Vaccine: What You Need to Know  Why get vaccinated? Tdap vaccine can prevent tetanus, diphtheria, and pertussis. Diphtheria and pertussis spread from person to person. Tetanus enters the body through cuts or wounds. · TETANUS (T) causes painful stiffening of the muscles. Tetanus can lead to serious health problems, including being unable to open the mouth, having trouble swallowing and breathing, or death. · DIPHTHERIA (D) can lead to difficulty breathing, heart failure, paralysis, or death. · PERTUSSIS (aP), also known as \"whooping cough,\" can cause uncontrollable, violent coughing which makes it hard to breathe, eat, or drink. Pertussis can be extremely serious in babies and young children, causing pneumonia, convulsions, brain damage, or death. In teens and adults, it can cause weight loss, loss of bladder control, passing out, and rib fractures from severe coughing. Tdap vaccine  Tdap is only for children 7 years and older, adolescents, and adults. Adolescents should receive a single dose of Tdap, preferably at age 6 or 15 years. Pregnant women should get a dose of Tdap during every pregnancy, to protect the  from pertussis. Infants are most at risk for severe, life threatening complications from pertussis. Adults who have never received Tdap should get a dose of Tdap. Also, adults should receive a booster dose every 10 years, or earlier in the case of a severe and dirty wound or burn. Booster doses can be either Tdap or Td (a different vaccine that protects against tetanus and diphtheria but not pertussis). Tdap may be given at the same time as other vaccines.   Talk with your health care provider  Tell your vaccine provider if the person getting the vaccine:  · Has had an allergic reaction after a previous dose of any vaccine that protects against tetanus, diphtheria, or pertussis, or has any severe, life threatening advice. The National Vaccine Injury Compensation Program  The National Vaccine Injury Compensation Program (VICP) is a federal program that was created to compensate people who may have been injured by certain vaccines. Visit the VICP website at www.hrsa.gov/vaccinecompensation or call 3-242.139.9519 to learn about the program and about filing a claim. There is a time limit to file a claim for compensation. How can I learn more? · Ask your health care provider. · Call your local or state health department. · Contact the Centers for Disease Control and Prevention (CDC):  ? Call 1-453.630.7830 (1-800-CDC-INFO) or  ? Visit CDC's website at www.cdc.gov/vaccines  Vaccine Information Statement (Interim)  Tdap (Tetanus, Diphtheria, Pertussis) Vaccine  04/01/2020  42 ERIC Ji [id number]-[id number]  Department of Health and Human Services  Centers for Disease Control and Prevention  Many Vaccine Information Statements are available in [place] and other languages. See www.immunize.org/vis. Muchas hojas de información sobre vacunas están disponibles en español y en otros idiomas. Visite www.immunize.org/vis. Care instructions adapted under license by [facility] ([facility]). If you have questions about a medical condition or this instruction, always ask your healthcare professional. [address] any warranty or liability for your use of this information.

## 2024-12-05 ENCOUNTER — HOSPITAL ENCOUNTER (OUTPATIENT)
Age: 29
Discharge: HOME | End: 2024-12-05
Payer: COMMERCIAL

## 2024-12-05 LAB
AMOUNT GLUCOSE GIVEN: 50 G
COLLECT TIME, 1HR GLUCOSE: NORMAL
ERYTHROCYTE [DISTWIDTH] IN BLOOD BY AUTOMATED COUNT: 13.4 % (ref 11.5–15)
GLUCOSE 3H P 100 G GLC PO SERPL-MCNC: 137 MG/DL
HCT VFR BLD AUTO: 35.1 % (ref 34–48)
HGB BLD-MCNC: 11.7 G/DL (ref 11.5–15.5)
MCH RBC QN AUTO: 31.3 PG (ref 26–35)
MCHC RBC AUTO-ENTMCNC: 33.3 G/DL (ref 32–34.5)
MCV RBC AUTO: 93.9 FL (ref 80–99.9)
PLATELET # BLD AUTO: 181 K/UL (ref 130–450)
PMV BLD AUTO: 10.4 FL (ref 7–12)
RBC # BLD AUTO: 3.74 M/UL (ref 3.5–5.5)
WBC OTHER # BLD: 6.1 K/UL (ref 4.5–11.5)

## 2024-12-05 PROCEDURE — 85027 COMPLETE CBC AUTOMATED: CPT

## 2024-12-05 PROCEDURE — 82950 GLUCOSE TEST: CPT

## 2024-12-05 PROCEDURE — 36415 COLL VENOUS BLD VENIPUNCTURE: CPT

## 2025-02-27 ENCOUNTER — APPOINTMENT (OUTPATIENT)
Dept: LABOR AND DELIVERY | Age: 30
End: 2025-02-27
Payer: COMMERCIAL

## 2025-02-27 ENCOUNTER — HOSPITAL ENCOUNTER (INPATIENT)
Age: 30
LOS: 2 days | Discharge: HOME | End: 2025-03-01
Attending: OBSTETRICS & GYNECOLOGY | Admitting: OBSTETRICS & GYNECOLOGY
Payer: COMMERCIAL

## 2025-02-27 PROBLEM — Z34.93 NORMAL PREGNANCY, THIRD TRIMESTER: Status: ACTIVE | Noted: 2025-02-27

## 2025-02-27 PROBLEM — Z34.93 NORMAL PREGNANCY, THIRD TRIMESTER: Status: RESOLVED | Noted: 2025-02-27 | Resolved: 2025-02-27

## 2025-02-27 LAB
ABO + RH BLD: NORMAL
ALBUMIN SERPL-MCNC: 3.6 G/DL (ref 3.5–5.2)
ALP SERPL-CCNC: 117 U/L (ref 35–104)
ALT SERPL-CCNC: 20 U/L (ref 0–32)
AMPHET UR QL SCN: NEGATIVE
ANION GAP SERPL CALCULATED.3IONS-SCNC: 13 MMOL/L (ref 7–16)
ARM BAND NUMBER: NORMAL
AST SERPL-CCNC: 22 U/L (ref 0–31)
BARBITURATES UR QL SCN: NEGATIVE
BENZODIAZ UR QL: NEGATIVE
BILIRUB SERPL-MCNC: 0.3 MG/DL (ref 0–1.2)
BLOOD BANK SAMPLE EXPIRATION: NORMAL
BLOOD GROUP ANTIBODIES SERPL: NEGATIVE
BUN SERPL-MCNC: 6 MG/DL (ref 6–20)
BUPRENORPHINE UR QL: NEGATIVE
CALCIUM SERPL-MCNC: 9.3 MG/DL (ref 8.6–10.2)
CANNABINOIDS UR QL SCN: NEGATIVE
CHLORIDE SERPL-SCNC: 103 MMOL/L (ref 98–107)
CO2 SERPL-SCNC: 20 MMOL/L (ref 22–29)
COCAINE UR QL SCN: NEGATIVE
CREAT SERPL-MCNC: 0.5 MG/DL (ref 0.5–1)
ERYTHROCYTE [DISTWIDTH] IN BLOOD BY AUTOMATED COUNT: 13.6 % (ref 11.5–15)
FENTANYL UR QL: NEGATIVE
GFR, ESTIMATED: >90 ML/MIN/1.73M2
GLUCOSE SERPL-MCNC: 79 MG/DL (ref 74–99)
HCT VFR BLD AUTO: 34.8 % (ref 34–48)
HGB BLD-MCNC: 12.1 G/DL (ref 11.5–15.5)
MCH RBC QN AUTO: 32.2 PG (ref 26–35)
MCHC RBC AUTO-ENTMCNC: 34.8 G/DL (ref 32–34.5)
MCV RBC AUTO: 92.6 FL (ref 80–99.9)
METHADONE UR QL: NEGATIVE
OPIATES UR QL SCN: NEGATIVE
OXYCODONE UR QL SCN: NEGATIVE
PCP UR QL SCN: NEGATIVE
PLATELET # BLD AUTO: 191 K/UL (ref 130–450)
PMV BLD AUTO: 11.1 FL (ref 7–12)
POTASSIUM SERPL-SCNC: 4 MMOL/L (ref 3.5–5)
PROT SERPL-MCNC: 6.9 G/DL (ref 6.4–8.3)
RBC # BLD AUTO: 3.76 M/UL (ref 3.5–5.5)
SODIUM SERPL-SCNC: 136 MMOL/L (ref 132–146)
TEST INFORMATION: NORMAL
WBC OTHER # BLD: 5.4 K/UL (ref 4.5–11.5)

## 2025-02-27 PROCEDURE — 6360000002 HC RX W HCPCS

## 2025-02-27 PROCEDURE — 85027 COMPLETE CBC AUTOMATED: CPT

## 2025-02-27 PROCEDURE — 6360000002 HC RX W HCPCS: Performed by: OBSTETRICS & GYNECOLOGY

## 2025-02-27 PROCEDURE — 86592 SYPHILIS TEST NON-TREP QUAL: CPT

## 2025-02-27 PROCEDURE — 6370000000 HC RX 637 (ALT 250 FOR IP): Performed by: OBSTETRICS & GYNECOLOGY

## 2025-02-27 PROCEDURE — 1220000001 HC SEMI PRIVATE L&D R&B

## 2025-02-27 PROCEDURE — 7200000001 HC VAGINAL DELIVERY

## 2025-02-27 PROCEDURE — 86901 BLOOD TYPING SEROLOGIC RH(D): CPT

## 2025-02-27 PROCEDURE — 80307 DRUG TEST PRSMV CHEM ANLYZR: CPT

## 2025-02-27 PROCEDURE — 3E033VJ INTRODUCTION OF OTHER HORMONE INTO PERIPHERAL VEIN, PERCUTANEOUS APPROACH: ICD-10-PCS | Performed by: OBSTETRICS & GYNECOLOGY

## 2025-02-27 PROCEDURE — 80053 COMPREHEN METABOLIC PANEL: CPT

## 2025-02-27 PROCEDURE — 86900 BLOOD TYPING SEROLOGIC ABO: CPT

## 2025-02-27 PROCEDURE — 86850 RBC ANTIBODY SCREEN: CPT

## 2025-02-27 PROCEDURE — 2580000003 HC RX 258: Performed by: OBSTETRICS & GYNECOLOGY

## 2025-02-27 RX ORDER — DOCUSATE SODIUM 100 MG/1
100 CAPSULE, LIQUID FILLED ORAL 2 TIMES DAILY
Status: DISCONTINUED | OUTPATIENT
Start: 2025-02-27 | End: 2025-02-27

## 2025-02-27 RX ORDER — SODIUM CHLORIDE 0.9 % (FLUSH) 0.9 %
5-40 SYRINGE (ML) INJECTION EVERY 12 HOURS SCHEDULED
Status: DISCONTINUED | OUTPATIENT
Start: 2025-02-27 | End: 2025-02-27

## 2025-02-27 RX ORDER — FERROUS SULFATE 325(65) MG
325 TABLET ORAL 2 TIMES DAILY WITH MEALS
Status: DISCONTINUED | OUTPATIENT
Start: 2025-02-28 | End: 2025-03-01 | Stop reason: HOSPADM

## 2025-02-27 RX ORDER — TERBUTALINE SULFATE 1 MG/ML
0.25 INJECTION SUBCUTANEOUS
Status: DISCONTINUED | OUTPATIENT
Start: 2025-02-27 | End: 2025-02-27

## 2025-02-27 RX ORDER — ONDANSETRON 4 MG/1
4 TABLET, ORALLY DISINTEGRATING ORAL EVERY 6 HOURS PRN
Status: DISCONTINUED | OUTPATIENT
Start: 2025-02-27 | End: 2025-03-01 | Stop reason: HOSPADM

## 2025-02-27 RX ORDER — ONDANSETRON 2 MG/ML
4 INJECTION INTRAMUSCULAR; INTRAVENOUS EVERY 6 HOURS PRN
Status: DISCONTINUED | OUTPATIENT
Start: 2025-02-27 | End: 2025-02-27

## 2025-02-27 RX ORDER — SODIUM CHLORIDE 0.9 % (FLUSH) 0.9 %
5-40 SYRINGE (ML) INJECTION PRN
Status: DISCONTINUED | OUTPATIENT
Start: 2025-02-27 | End: 2025-03-01 | Stop reason: HOSPADM

## 2025-02-27 RX ORDER — METHYLERGONOVINE MALEATE 0.2 MG/ML
200 INJECTION INTRAVENOUS PRN
Status: DISCONTINUED | OUTPATIENT
Start: 2025-02-27 | End: 2025-02-27

## 2025-02-27 RX ORDER — SODIUM CHLORIDE, SODIUM LACTATE, POTASSIUM CHLORIDE, AND CALCIUM CHLORIDE .6; .31; .03; .02 G/100ML; G/100ML; G/100ML; G/100ML
500 INJECTION, SOLUTION INTRAVENOUS PRN
Status: DISCONTINUED | OUTPATIENT
Start: 2025-02-27 | End: 2025-02-27

## 2025-02-27 RX ORDER — NALBUPHINE HYDROCHLORIDE 10 MG/ML
10 INJECTION INTRAMUSCULAR; INTRAVENOUS; SUBCUTANEOUS
Status: DISCONTINUED | OUTPATIENT
Start: 2025-02-27 | End: 2025-02-27

## 2025-02-27 RX ORDER — CARBOPROST TROMETHAMINE 250 UG/ML
250 INJECTION, SOLUTION INTRAMUSCULAR PRN
Status: DISCONTINUED | OUTPATIENT
Start: 2025-02-27 | End: 2025-02-27

## 2025-02-27 RX ORDER — IBUPROFEN 800 MG/1
800 TABLET, FILM COATED ORAL EVERY 8 HOURS SCHEDULED
Status: DISCONTINUED | OUTPATIENT
Start: 2025-02-27 | End: 2025-03-01 | Stop reason: HOSPADM

## 2025-02-27 RX ORDER — ONDANSETRON 2 MG/ML
4 INJECTION INTRAMUSCULAR; INTRAVENOUS EVERY 6 HOURS PRN
Status: DISCONTINUED | OUTPATIENT
Start: 2025-02-27 | End: 2025-03-01 | Stop reason: HOSPADM

## 2025-02-27 RX ORDER — ONDANSETRON 4 MG/1
4 TABLET, ORALLY DISINTEGRATING ORAL EVERY 6 HOURS PRN
Status: DISCONTINUED | OUTPATIENT
Start: 2025-02-27 | End: 2025-02-27

## 2025-02-27 RX ORDER — LIDOCAINE HYDROCHLORIDE 10 MG/ML
INJECTION, SOLUTION INFILTRATION; PERINEURAL
Status: COMPLETED
Start: 2025-02-27 | End: 2025-02-27

## 2025-02-27 RX ORDER — SODIUM CHLORIDE 9 MG/ML
25 INJECTION, SOLUTION INTRAVENOUS PRN
Status: DISCONTINUED | OUTPATIENT
Start: 2025-02-27 | End: 2025-02-27

## 2025-02-27 RX ORDER — ACETAMINOPHEN 500 MG
1000 TABLET ORAL EVERY 8 HOURS SCHEDULED
Status: DISCONTINUED | OUTPATIENT
Start: 2025-02-27 | End: 2025-03-01 | Stop reason: HOSPADM

## 2025-02-27 RX ORDER — SODIUM CHLORIDE 9 MG/ML
INJECTION, SOLUTION INTRAVENOUS PRN
Status: DISCONTINUED | OUTPATIENT
Start: 2025-02-27 | End: 2025-03-01 | Stop reason: HOSPADM

## 2025-02-27 RX ORDER — NALBUPHINE HYDROCHLORIDE 10 MG/ML
5 INJECTION INTRAMUSCULAR; INTRAVENOUS; SUBCUTANEOUS
Status: DISCONTINUED | OUTPATIENT
Start: 2025-02-27 | End: 2025-02-27

## 2025-02-27 RX ORDER — MODIFIED LANOLIN
OINTMENT (GRAM) TOPICAL PRN
Status: DISCONTINUED | OUTPATIENT
Start: 2025-02-27 | End: 2025-03-01 | Stop reason: HOSPADM

## 2025-02-27 RX ORDER — SODIUM CHLORIDE 0.9 % (FLUSH) 0.9 %
5-40 SYRINGE (ML) INJECTION PRN
Status: DISCONTINUED | OUTPATIENT
Start: 2025-02-27 | End: 2025-02-27

## 2025-02-27 RX ORDER — SODIUM CHLORIDE 0.9 % (FLUSH) 0.9 %
5-40 SYRINGE (ML) INJECTION EVERY 12 HOURS SCHEDULED
Status: DISCONTINUED | OUTPATIENT
Start: 2025-02-27 | End: 2025-03-01 | Stop reason: HOSPADM

## 2025-02-27 RX ORDER — MISOPROSTOL 200 UG/1
400 TABLET ORAL PRN
Status: DISCONTINUED | OUTPATIENT
Start: 2025-02-27 | End: 2025-02-27

## 2025-02-27 RX ORDER — TRANEXAMIC ACID 10 MG/ML
1000 INJECTION, SOLUTION INTRAVENOUS
Status: DISCONTINUED | OUTPATIENT
Start: 2025-02-27 | End: 2025-02-27

## 2025-02-27 RX ORDER — SODIUM CHLORIDE, SODIUM LACTATE, POTASSIUM CHLORIDE, CALCIUM CHLORIDE 600; 310; 30; 20 MG/100ML; MG/100ML; MG/100ML; MG/100ML
INJECTION, SOLUTION INTRAVENOUS CONTINUOUS
Status: DISCONTINUED | OUTPATIENT
Start: 2025-02-27 | End: 2025-02-27

## 2025-02-27 RX ORDER — DOCUSATE SODIUM 100 MG/1
100 CAPSULE, LIQUID FILLED ORAL 2 TIMES DAILY
Status: DISCONTINUED | OUTPATIENT
Start: 2025-02-27 | End: 2025-03-01 | Stop reason: HOSPADM

## 2025-02-27 RX ADMIN — SODIUM CHLORIDE, SODIUM LACTATE, POTASSIUM CHLORIDE, AND CALCIUM CHLORIDE: .6; .31; .03; .02 INJECTION, SOLUTION INTRAVENOUS at 08:26

## 2025-02-27 RX ADMIN — Medication: at 18:36

## 2025-02-27 RX ADMIN — SODIUM CHLORIDE, SODIUM LACTATE, POTASSIUM CHLORIDE, AND CALCIUM CHLORIDE: .6; .31; .03; .02 INJECTION, SOLUTION INTRAVENOUS at 14:45

## 2025-02-27 RX ADMIN — DOCUSATE SODIUM 100 MG: 100 CAPSULE, LIQUID FILLED ORAL at 22:07

## 2025-02-27 RX ADMIN — LIDOCAINE HYDROCHLORIDE 200 MG: 10 INJECTION, SOLUTION INFILTRATION; PERINEURAL at 16:21

## 2025-02-27 RX ADMIN — Medication 2 MILLI-UNITS/MIN: at 08:38

## 2025-02-27 RX ADMIN — IBUPROFEN 800 MG: 800 TABLET, FILM COATED ORAL at 23:37

## 2025-02-27 RX ADMIN — NALBUPHINE HYDROCHLORIDE 5 MG: 10 INJECTION INTRAMUSCULAR; INTRAVENOUS; SUBCUTANEOUS at 12:50

## 2025-02-27 ASSESSMENT — PAIN DESCRIPTION - DESCRIPTORS
DESCRIPTORS: CRAMPING
DESCRIPTORS: CRAMPING;ACHING;DISCOMFORT

## 2025-02-27 ASSESSMENT — PAIN - FUNCTIONAL ASSESSMENT
PAIN_FUNCTIONAL_ASSESSMENT: ACTIVITIES ARE NOT PREVENTED
PAIN_FUNCTIONAL_ASSESSMENT: ACTIVITIES ARE NOT PREVENTED

## 2025-02-27 ASSESSMENT — PAIN SCALES - GENERAL
PAINLEVEL_OUTOF10: 10
PAINLEVEL_OUTOF10: 0
PAINLEVEL_OUTOF10: 3
PAINLEVEL_OUTOF10: 10

## 2025-02-27 ASSESSMENT — PAIN DESCRIPTION - ORIENTATION
ORIENTATION: LOWER
ORIENTATION: MID;LOWER

## 2025-02-27 ASSESSMENT — PAIN DESCRIPTION - LOCATION
LOCATION: ABDOMEN;BACK
LOCATION: ABDOMEN

## 2025-02-27 NOTE — PROGRESS NOTES
GP: 3/2     EDC: 2/27/25    Patient arrived ambulatory to the Labor and Delivery unit  for scheduled IOL.  EFM applied and fetal well being established. Patient assessed and information obtained   about health and history. Patient oriented to room and call light.     Dr. Ray aware of arrival. Orders received. Plan of care discussed with patient. Patient verbalizes understanding.

## 2025-02-27 NOTE — H&P
Subjective:      Lauren Lopez is an 29 y.o. female at 39 and 6/7 weeks gestation presenting for labor induction  .She is also complaining of contractions every a few minutes lasting few seconds. Other associated symptoms include low back pain. Fetal Movement: normal.  Patient's medications, allergies, past medical, surgical, social and family histories were reviewed and updated as appropriate.  Social History:  TOBACCO:   reports that she has quit smoking. Her smoking use included cigarettes. She has never used smokeless tobacco.  ETOH:   reports no history of alcohol use.  DRUGS:   reports no history of drug use.    Past Medical History:  Past Medical History:   Diagnosis Date    Abnormal Pap smear     False labor before 37 completed weeks of gestation in third trimester 2019    Intrauterine growth restriction affecting care of mother, antepartum, third trimester, not applicable or unspecified fetus 2019    Iron deficiency anemia 2015     (spontaneous vaginal delivery) 3/4/2019         Past Surgical History:      Procedure Laterality Date    TRANSESOPHAGEAL ECHOCARDIOGRAM N/A 2020    TRANSESOPHAGEAL ECHOCARDIOGRAM performed by Klaudia Zapata MD at [facility] ENDOSCOPY       Family History:   History reviewed. No pertinent family history.    meds:  Current Facility-Administered Medications:     lactated ringers infusion, , IntraVENous, Continuous, Rico Kathleen MD, Last Rate: 125 mL/hr at 25, New Bag at 25    lactated ringers bolus 500 mL, 500 mL, IntraVENous, PRN **OR** lactated ringers bolus 500 mL, 500 mL, IntraVENous, PRN, Rico Kathleen MD    sodium chloride flush 0.9 % injection 5-40 mL, 5-40 mL, IntraVENous, 2 times per day, Rico Kathleen MD    sodium chloride flush 0.9 % injection 5-40 mL, 5-40 mL, IntraVENous, PRN, Rico Kathleen MD    0.9 % sodium chloride infusion, 25 mL, IntraVENous, PRN, Rico Kathleen MD    methylergonovine

## 2025-02-27 NOTE — PROGRESS NOTES
Dr. Ray called and notified of patient status update of 1-2/ 70%/-2 patient requesting IV pain medication. Patient does not desire epidural at this time. New order received for  Nubain 5 mg q 3 prn.

## 2025-02-27 NOTE — L&D DELIVERY NOTE
Department of Obstetrics and Gynecology  Spontaneous Vaginal Delivery Note         Pre-operative Diagnosis:  Term pregnancy, Induced labor, Single fetus, and Uncomplicated pregnancy    Post-operative Diagnosis:  Same + live female wt 6 #,0 oz  Procedure:  Spontaneous vaginal delivery    Surgeon:  Rico Kathleen MD    Anesthesia:  none    Estimated blood loss:  300ml    Specimen:  Placenta not sent to pathology     Cord blood sent Yes    Complications:  none    Condition:  infant stable to general nursery and mother stable    Details of Procedure:   The patient is a 29 y.o. female at 39w6d   OB History          3    Para   2    Term   2            AB        Living   2         SAB        IAB        Ectopic        Molar        Multiple   0    Live Births   1             who was admitted for latent labor. She received the following interventions: IV Pitocin induction She was known to be GBS negative and did not receive antibiotic prophylaxis. The patient progressed well,did receive an epidural, became complete and started to push. After pushing for 1 x the fetal head was at the perineum, nose and mouth suctioned with bulb suction and the rest of the infant delivered atraumatically, placed on mother abdomen.Cord was clamped and cut and infant handed off to the waiting nurse for evaluation. The delivery of the placenta was spontaneous. The perineum and vagina were explored and no tears or lacerations were noted.

## 2025-02-27 NOTE — PROGRESS NOTES
Dr. Ray called and notified patient had SROM @ 1405 with clear moderate fluid. SVE 4-5 cm/80%/-1. Continue plan of care.

## 2025-02-28 LAB
HCT VFR BLD AUTO: 30.8 % (ref 34–48)
HGB BLD-MCNC: 10.5 G/DL (ref 11.5–15.5)
RPR SER QL: NONREACTIVE

## 2025-02-28 PROCEDURE — 85014 HEMATOCRIT: CPT

## 2025-02-28 PROCEDURE — 1220000001 HC SEMI PRIVATE L&D R&B

## 2025-02-28 PROCEDURE — 6370000000 HC RX 637 (ALT 250 FOR IP): Performed by: OBSTETRICS & GYNECOLOGY

## 2025-02-28 PROCEDURE — 85018 HEMOGLOBIN: CPT

## 2025-02-28 RX ORDER — IBUPROFEN 800 MG/1
800 TABLET, FILM COATED ORAL EVERY 8 HOURS PRN
Qty: 120 TABLET | Refills: 0 | Status: SHIPPED | OUTPATIENT
Start: 2025-02-28

## 2025-02-28 RX ADMIN — DOCUSATE SODIUM 100 MG: 100 CAPSULE, LIQUID FILLED ORAL at 08:21

## 2025-02-28 RX ADMIN — IBUPROFEN 800 MG: 800 TABLET, FILM COATED ORAL at 08:27

## 2025-02-28 ASSESSMENT — PAIN SCALES - GENERAL: PAINLEVEL_OUTOF10: 7

## 2025-02-28 ASSESSMENT — PAIN DESCRIPTION - LOCATION: LOCATION: BUTTOCKS

## 2025-02-28 ASSESSMENT — PAIN DESCRIPTION - DESCRIPTORS: DESCRIPTORS: PRESSURE

## 2025-02-28 NOTE — PROGRESS NOTES
1540 SVE preformed patient 6-7 cm/100%/-1    RN to the bedside @ 1550 patient reports feeling pressure. SVE preformed lip/0/100% Patient involuntarily pushing call light pressed for assistance and for call for delivery to provider.    Birth of live female  via  @ 1559. Apgars 9/9. Weight 6 pounds.

## 2025-02-28 NOTE — PLAN OF CARE
Problem: Vaginal Birth or  Section  Goal: Fetal and maternal status remain reassuring during the birth process  Description:  Birth OB-Pregnancy care plan goal which identifies if the fetal and maternal status remain reassuring during the birth process  2025 by Oliva Angel RN  Outcome: Completed     Problem: Postpartum  Goal: Experiences normal postpartum course  Description:  Postpartum OB-Pregnancy care plan goal which identifies if the mother is experiencing a normal postpartum course  2025 by Roxana Hancock RN  Outcome: Progressing  2025 by Oliva Angel RN  Outcome: Progressing  Goal: Appropriate maternal -  bonding  Description:  Postpartum OB-Pregnancy care plan goal which identifies if the mother and  are bonding appropriately  2025 by Roxana Hancock RN  Outcome: Progressing  2025 by Oliva Angel RN  Outcome: Progressing  Goal: Establishment of infant feeding pattern  Description:  Postpartum OB-Pregnancy care plan goal which identifies if the mother is establishing a feeding pattern with their   2025 by Roxana Hancock RN  Outcome: Progressing  2025 by Oliva Angel RN  Outcome: Progressing  Goal: Incisions, wounds, or drain sites healing without S/S of infection  2025 by Roxana Hancock RN  Outcome: Progressing  2025 by Oliva Angel RN  Outcome: Progressing     Problem: Pain  Goal: Verbalizes/displays adequate comfort level or baseline comfort level  2025 by Roxana Hancock RN  Outcome: Progressing  2025 by Oliva Angel RN  Outcome: Progressing     Problem: Infection - Adult  Goal: Absence of infection at discharge  2025 by Roxana Hancock RN  Outcome: Progressing  2025 by Oliva Angel RN  Outcome: Progressing  Goal: Absence of infection during hospitalization  2025 by Roxana Hancock RN  Outcome:  Progressing  2/28/2025 0010 by Oliva Angel RN  Outcome: Progressing  Goal: Absence of fever/infection during anticipated neutropenic period  2/28/2025 0848 by Roxana Hancock RN  Outcome: Progressing  2/28/2025 0010 by Oliva Angel RN  Outcome: Progressing     Problem: Safety - Adult  Goal: Free from fall injury  2/28/2025 0848 by Roxana Hancock RN  Outcome: Progressing  2/28/2025 0010 by Oliva Angel RN  Outcome: Progressing     Problem: Discharge Planning  Goal: Discharge to home or other facility with appropriate resources  2/28/2025 0848 by Roxana Hancock RN  Outcome: Progressing  2/28/2025 0010 by Oliva Angel RN  Outcome: Progressing     Problem: Chronic Conditions and Co-morbidities  Goal: Patient's chronic conditions and co-morbidity symptoms are monitored and maintained or improved  2/28/2025 0848 by Roxana Hancock RN  Outcome: Progressing  2/28/2025 0010 by Oliva Angel RN  Outcome: Progressing

## 2025-02-28 NOTE — PROGRESS NOTES
Department of Obstetrics and Gynecology  Labor and Delivery  Attending Post Partum Progress Note      SUBJECTIVE:  Doing well with no complaints  OBJECTIVE:      Vitals:  /62   Pulse 89   Temp 98.2 °F (36.8 °C) (Oral)   Resp 16   Ht 1.549 m (5' 1\")   Wt 54.4 kg (120 lb)   SpO2 97%   Breastfeeding Unknown   BMI 22.67 kg/m²     ABDOMEN:  Soft, well contracted uterus   Lochia: Normal  No calf tenderness    DATA:    CBC:    Lab Results   Component Value Date/Time    WBC 5.4 02/27/2025 08:20 AM    RBC 3.76 02/27/2025 08:20 AM    HGB 10.5 02/28/2025 06:00 AM    HCT 30.8 02/28/2025 06:00 AM    MCV 92.6 02/27/2025 08:20 AM    RDW 13.6 02/27/2025 08:20 AM     02/27/2025 08:20 AM       ASSESSMENT & PLAN:      PPD # 1    Continue current care.  Discharge home tomorrow.

## 2025-02-28 NOTE — DISCHARGE SUMMARY
Obstetrical Discharge Form         Patients Name  Lauren Lopez    Gestational Age:  39w6d    Antepartum complications: none    Date of Delivery:   2025      3:59 PM     Type of Delivery:   Vaginal, Spontaneous [250]  Rupture Date/time:    2025      2:05 PM     Presentation:    Vertex [1]  Position:                      Anesthesia:    None [250]    Feeding method:         Delivered By:   MEENA-AAL, AMINE R    Baby:       Information for the patient's :  Rich Lopez [[id number]]        Intrapartum complications: None  Postpartum complications: none  Discharge Date:   3/1/25  Discharge Condition: Stable  Disposition:   Home    Plan:   Follow up    in 6 weeks

## 2025-02-28 NOTE — PROGRESS NOTES
Hearing screening results were discussed with parent. Questions answered. Brochure given to parent. Advised to monitor developmental milestones and contact physician for any concerns.   Electronically signed by Alexis Prescott on 2/28/2025 at 8:43 AM

## 2025-02-28 NOTE — PROGRESS NOTES
Assumed care of pt for this shift.  Discussed plan of care for the shift.  Pt verbalizes understanding without questions at this time.  Pt complains of mild discomfort in her bottom.  Rest encouraged, call light in reach.

## 2025-03-01 VITALS
TEMPERATURE: 97 F | RESPIRATION RATE: 18 BRPM | WEIGHT: 120 LBS | HEART RATE: 82 BPM | SYSTOLIC BLOOD PRESSURE: 88 MMHG | BODY MASS INDEX: 22.66 KG/M2 | OXYGEN SATURATION: 97 % | DIASTOLIC BLOOD PRESSURE: 54 MMHG | HEIGHT: 61 IN

## 2025-03-01 NOTE — PROGRESS NOTES
Assumed care of patient for this shift. Patient resting in bed. No complaints of discomfort at this time. Plan of care for night discussed, patient verbalizes understanding. Safe sleep policy discussed, patient verbalizes understanding. PO fluids bedside. Call light placed within reach.

## 2025-03-01 NOTE — PLAN OF CARE
Problem: Postpartum  Goal: Experiences normal postpartum course  Description:  Postpartum OB-Pregnancy care plan goal which identifies if the mother is experiencing a normal postpartum course  3/1/2025 0728 by Chi Zavala RN  Outcome: Completed  3/1/2025 0058 by Kinsey Campbell RN  Outcome: Progressing  Goal: Appropriate maternal -  bonding  Description:  Postpartum OB-Pregnancy care plan goal which identifies if the mother and  are bonding appropriately  3/1/2025 0728 by Chi Zavala RN  Outcome: Completed  3/1/2025 0058 by Kinsey Campbell RN  Outcome: Progressing  Goal: Establishment of infant feeding pattern  Description:  Postpartum OB-Pregnancy care plan goal which identifies if the mother is establishing a feeding pattern with their   3/1/2025 0728 by Chi Zavala RN  Outcome: Completed  3/1/2025 0058 by Kinsey Campbell RN  Outcome: Progressing  Goal: Incisions, wounds, or drain sites healing without S/S of infection  3/1/2025 0728 by Chi Zavala RN  Outcome: Completed  3/1/2025 0058 by Kinsey Campbell RN  Outcome: Progressing     Problem: Pain  Goal: Verbalizes/displays adequate comfort level or baseline comfort level  Outcome: Completed     Problem: Infection - Adult  Goal: Absence of infection at discharge  Outcome: Completed  Goal: Absence of infection during hospitalization  Outcome: Completed  Goal: Absence of fever/infection during anticipated neutropenic period  Outcome: Completed     Problem: Safety - Adult  Goal: Free from fall injury  Outcome: Completed     Problem: Discharge Planning  Goal: Discharge to home or other facility with appropriate resources  Outcome: Completed     Problem: Chronic Conditions and Co-morbidities  Goal: Patient's chronic conditions and co-morbidity symptoms are monitored and maintained or improved  Outcome: Completed

## (undated) DEVICE — GOWN,SIRUS,NONRNF,SETINSLV,XL,20/CS: Brand: MEDLINE

## (undated) DEVICE — PEN: MARKING STD 100/CS: Brand: MEDICAL ACTION INDUSTRIES